# Patient Record
Sex: FEMALE | Race: BLACK OR AFRICAN AMERICAN | NOT HISPANIC OR LATINO | Employment: UNEMPLOYED
[De-identification: names, ages, dates, MRNs, and addresses within clinical notes are randomized per-mention and may not be internally consistent; named-entity substitution may affect disease eponyms.]

---

## 2017-11-16 ENCOUNTER — RECORDS - HEALTHEAST (OUTPATIENT)
Dept: ADMINISTRATIVE | Facility: OTHER | Age: 57
End: 2017-11-16

## 2018-01-13 ENCOUNTER — RECORDS - HEALTHEAST (OUTPATIENT)
Dept: ADMINISTRATIVE | Facility: OTHER | Age: 58
End: 2018-01-13

## 2018-01-26 ENCOUNTER — RECORDS - HEALTHEAST (OUTPATIENT)
Dept: ADMINISTRATIVE | Facility: OTHER | Age: 58
End: 2018-01-26

## 2018-02-06 ENCOUNTER — RECORDS - HEALTHEAST (OUTPATIENT)
Dept: ADMINISTRATIVE | Facility: OTHER | Age: 58
End: 2018-02-06

## 2018-02-07 ENCOUNTER — COMMUNICATION - HEALTHEAST (OUTPATIENT)
Dept: ONCOLOGY | Facility: HOSPITAL | Age: 58
End: 2018-02-07

## 2018-02-09 ENCOUNTER — RECORDS - HEALTHEAST (OUTPATIENT)
Dept: ADMINISTRATIVE | Facility: OTHER | Age: 58
End: 2018-02-09

## 2018-02-21 ENCOUNTER — RECORDS - HEALTHEAST (OUTPATIENT)
Dept: ADMINISTRATIVE | Facility: OTHER | Age: 58
End: 2018-02-21

## 2018-03-02 ENCOUNTER — RECORDS - HEALTHEAST (OUTPATIENT)
Dept: RADIOLOGY | Facility: CLINIC | Age: 58
End: 2018-03-02

## 2018-03-06 ENCOUNTER — AMBULATORY - HEALTHEAST (OUTPATIENT)
Dept: INFUSION THERAPY | Facility: HOSPITAL | Age: 58
End: 2018-03-06

## 2018-03-06 ENCOUNTER — COMMUNICATION - HEALTHEAST (OUTPATIENT)
Dept: TELEHEALTH | Facility: CLINIC | Age: 58
End: 2018-03-06

## 2018-03-06 ENCOUNTER — OFFICE VISIT - HEALTHEAST (OUTPATIENT)
Dept: ONCOLOGY | Facility: HOSPITAL | Age: 58
End: 2018-03-06

## 2018-03-06 DIAGNOSIS — R79.9 ABNORMAL FINDING OF BLOOD CHEMISTRY: ICD-10-CM

## 2018-03-06 DIAGNOSIS — C91.10 CLL (CHRONIC LYMPHOCYTIC LEUKEMIA) (H): ICD-10-CM

## 2018-03-06 LAB
ALBUMIN SERPL-MCNC: 3.4 G/DL (ref 3.5–5)
ALP SERPL-CCNC: 146 U/L (ref 45–120)
ALT SERPL W P-5'-P-CCNC: 36 U/L (ref 0–45)
ANION GAP SERPL CALCULATED.3IONS-SCNC: 10 MMOL/L (ref 5–18)
AST SERPL W P-5'-P-CCNC: 16 U/L (ref 0–40)
BASOPHILS # BLD AUTO: 0.1 THOU/UL (ref 0–0.2)
BASOPHILS NFR BLD AUTO: 0 % (ref 0–2)
BILIRUB SERPL-MCNC: 0.5 MG/DL (ref 0–1)
BUN SERPL-MCNC: 16 MG/DL (ref 8–22)
CALCIUM SERPL-MCNC: 9.3 MG/DL (ref 8.5–10.5)
CHLORIDE BLD-SCNC: 106 MMOL/L (ref 98–107)
CHOLEST SERPL-MCNC: 138 MG/DL
CO2 SERPL-SCNC: 25 MMOL/L (ref 22–31)
CREAT SERPL-MCNC: 0.83 MG/DL (ref 0.6–1.1)
EOSINOPHIL # BLD AUTO: 0.1 THOU/UL (ref 0–0.4)
EOSINOPHIL NFR BLD AUTO: 1 % (ref 0–6)
ERYTHROCYTE [DISTWIDTH] IN BLOOD BY AUTOMATED COUNT: 15.6 % (ref 11–14.5)
FASTING STATUS PATIENT QL REPORTED: YES
GFR SERPL CREATININE-BSD FRML MDRD: >60 ML/MIN/1.73M2
GLUCOSE BLD-MCNC: 125 MG/DL (ref 70–125)
HCT VFR BLD AUTO: 40.5 % (ref 35–47)
HDLC SERPL-MCNC: 44 MG/DL
HGB BLD-MCNC: 13.2 G/DL (ref 12–16)
LDLC SERPL CALC-MCNC: 71 MG/DL
LYMPHOCYTES # BLD AUTO: 5.7 THOU/UL (ref 0.8–4.4)
LYMPHOCYTES NFR BLD AUTO: 31 % (ref 20–40)
MCH RBC QN AUTO: 27.6 PG (ref 27–34)
MCHC RBC AUTO-ENTMCNC: 32.6 G/DL (ref 32–36)
MCV RBC AUTO: 85 FL (ref 80–100)
MONOCYTES # BLD AUTO: 1.6 THOU/UL (ref 0–0.9)
MONOCYTES NFR BLD AUTO: 9 % (ref 2–10)
NEUTROPHILS # BLD AUTO: 11 THOU/UL (ref 2–7.7)
NEUTROPHILS NFR BLD AUTO: 60 % (ref 50–70)
PLATELET # BLD AUTO: 474 THOU/UL (ref 140–440)
PMV BLD AUTO: 9.5 FL (ref 8.5–12.5)
POTASSIUM BLD-SCNC: 4.4 MMOL/L (ref 3.5–5)
PROT SERPL-MCNC: 7.3 G/DL (ref 6–8)
RBC # BLD AUTO: 4.79 MILL/UL (ref 3.8–5.4)
SODIUM SERPL-SCNC: 141 MMOL/L (ref 136–145)
TRIGL SERPL-MCNC: 116 MG/DL
WBC: 18.4 THOU/UL (ref 4–11)

## 2018-03-06 RX ORDER — METOPROLOL TARTRATE 25 MG/1
25 TABLET, FILM COATED ORAL 2 TIMES DAILY
Refills: 0 | Status: SHIPPED | COMMUNITY
Start: 2018-01-15

## 2018-03-06 RX ORDER — METFORMIN HCL 500 MG
500 TABLET, EXTENDED RELEASE 24 HR ORAL 2 TIMES DAILY
Refills: 0 | Status: SHIPPED | COMMUNITY
Start: 2018-01-23

## 2018-03-06 ASSESSMENT — MIFFLIN-ST. JEOR: SCORE: 1693.06

## 2018-03-07 LAB
LAB AP CHARGES (HE HISTORICAL CONVERSION): NORMAL
PATH REPORT.COMMENTS IMP SPEC: NORMAL
PATH REPORT.COMMENTS IMP SPEC: NORMAL
PATH REPORT.FINAL DX SPEC: NORMAL
PATH REPORT.MICROSCOPIC SPEC OTHER STN: NORMAL
RESULT FLAG (HE HISTORICAL CONVERSION): NORMAL

## 2018-03-09 ENCOUNTER — HOSPITAL ENCOUNTER (OUTPATIENT)
Dept: CARDIOLOGY | Facility: HOSPITAL | Age: 58
Discharge: HOME | End: 2018-03-09

## 2018-03-09 DIAGNOSIS — I38 VALVULAR HEART DISEASE: ICD-10-CM

## 2018-03-09 LAB
AORTIC ROOT: 3.6 CM
AORTIC VALVE MEAN VELOCITY: 97.4 CM/S
AV DIMENSIONLESS INDEX VTI: 0.9
AV MEAN GRADIENT: 4 MMHG
AV PEAK GRADIENT: 9.5 MMHG
AV VALVE AREA: 2.6 CM2
AV VELOCITY RATIO: 0.6
BSA FOR ECHO PROCEDURE: 2.27 M2
CV BLOOD PRESSURE: NORMAL MMHG
CV ECHO HEIGHT: 66 IN
CV ECHO WEIGHT: 245 LBS
DOP CALC AO PEAK VEL: 154 CM/S
DOP CALC AO VTI: 21.1 CM
DOP CALC LVOT AREA: 2.83 CM2
DOP CALC LVOT DIAMETER: 1.9 CM
DOP CALC LVOT PEAK VEL: 97.5 CM/S
DOP CALC LVOT STROKE VOLUME: 55 CM3
DOP CALCLVOT PEAK VEL VTI: 19.4 CM
EJECTION FRACTION: 56 % (ref 55–75)
FRACTIONAL SHORTENING: 23.9 % (ref 28–44)
INTERVENTRICULAR SEPTUM IN END DIASTOLE: 1.2 CM (ref 0.6–0.9)
IVS/PW RATIO: 1.4
LA AREA 1: 14 CM2
LA AREA 2: 17 CM2
LEFT ATRIUM LENGTH: 4.4 CM
LEFT ATRIUM SIZE: 3.8 CM
LEFT ATRIUM VOLUME INDEX: 20.3 ML/M2
LEFT ATRIUM VOLUME: 46 ML
LEFT VENTRICLE CARDIAC INDEX: 2.3 L/MIN/M2
LEFT VENTRICLE CARDIAC OUTPUT: 5.1 L/MIN
LEFT VENTRICLE DIASTOLIC VOLUME INDEX: 23.3 CM3/M2 (ref 34–74)
LEFT VENTRICLE DIASTOLIC VOLUME: 53 CM3 (ref 46–106)
LEFT VENTRICLE HEART RATE: 93 BPM
LEFT VENTRICLE MASS INDEX: 85.8 G/M2
LEFT VENTRICLE SYSTOLIC VOLUME INDEX: 10.4 CM3/M2 (ref 11–31)
LEFT VENTRICLE SYSTOLIC VOLUME: 23.5 CM3 (ref 14–42)
LEFT VENTRICULAR INTERNAL DIMENSION IN DIASTOLE: 5.1 CM (ref 3.8–5.2)
LEFT VENTRICULAR INTERNAL DIMENSION IN SYSTOLE: 3.88 CM (ref 2.2–3.5)
LEFT VENTRICULAR MASS: 194.7 G
LEFT VENTRICULAR OUTFLOW TRACT MEAN GRADIENT: 3 MMHG
LEFT VENTRICULAR OUTFLOW TRACT MEAN VELOCITY: 79.2 CM/S
LEFT VENTRICULAR OUTFLOW TRACT PEAK GRADIENT: 4 MMHG
LEFT VENTRICULAR POSTERIOR WALL IN END DIASTOLE: 0.85 CM (ref 0.6–0.9)
LV STROKE VOLUME INDEX: 24.2 ML/M2
MITRAL VALVE E/A RATIO: 0.6
MV AVERAGE E/E' RATIO: 7.7 CM/S
MV DECELERATION TIME: 140 MS
MV E'TISSUE VEL-LAT: 7.45 CM/S
MV E'TISSUE VEL-MED: 6.58 CM/S
MV LATERAL E/E' RATIO: 7.2
MV MEDIAL E/E' RATIO: 8.2
MV PEAK A VELOCITY: 82.9 CM/S
MV PEAK E VELOCITY: 53.8 CM/S
NUC REST DIASTOLIC VOLUME INDEX: 3920 LBS
NUC REST SYSTOLIC VOLUME INDEX: 66 IN
TRICUSPID REGURGITATION PEAK PRESSURE GRADIENT: 23 MMHG
TRICUSPID VALVE ANULAR PLANE SYSTOLIC EXCURSION: 2.2 CM
TRICUSPID VALVE PEAK REGURGITANT VELOCITY: 240 CM/S

## 2018-03-09 ASSESSMENT — MIFFLIN-ST. JEOR: SCORE: 1693.06

## 2018-03-14 ENCOUNTER — OFFICE VISIT - HEALTHEAST (OUTPATIENT)
Dept: ONCOLOGY | Facility: HOSPITAL | Age: 58
End: 2018-03-14

## 2018-03-14 DIAGNOSIS — D47.9 ATYPICAL LYMPHOPROLIFERATIVE DISORDER (H): ICD-10-CM

## 2018-03-27 ENCOUNTER — RECORDS - HEALTHEAST (OUTPATIENT)
Dept: RADIOLOGY | Facility: CLINIC | Age: 58
End: 2018-03-27

## 2018-04-20 ENCOUNTER — AMBULATORY - HEALTHEAST (OUTPATIENT)
Dept: ADMINISTRATIVE | Facility: REHABILITATION | Age: 58
End: 2018-04-20

## 2018-04-20 DIAGNOSIS — M17.9 OSTEOARTHRITIS OF KNEE, UNSPECIFIED (CODE): ICD-10-CM

## 2018-09-12 ENCOUNTER — AMBULATORY - HEALTHEAST (OUTPATIENT)
Dept: ADMINISTRATIVE | Facility: REHABILITATION | Age: 58
End: 2018-09-12

## 2018-09-12 ENCOUNTER — RECORDS - HEALTHEAST (OUTPATIENT)
Dept: LAB | Facility: CLINIC | Age: 58
End: 2018-09-12

## 2018-09-12 DIAGNOSIS — M54.9 BACK PAIN: ICD-10-CM

## 2018-09-12 LAB
ALBUMIN SERPL-MCNC: 3.8 G/DL (ref 3.5–5)
ALP SERPL-CCNC: 155 U/L (ref 45–120)
ALT SERPL W P-5'-P-CCNC: 28 U/L (ref 0–45)
ANION GAP SERPL CALCULATED.3IONS-SCNC: 9 MMOL/L (ref 5–18)
AST SERPL W P-5'-P-CCNC: 18 U/L (ref 0–40)
BILIRUB SERPL-MCNC: 0.6 MG/DL (ref 0–1)
BUN SERPL-MCNC: 10 MG/DL (ref 8–22)
CALCIUM SERPL-MCNC: 10 MG/DL (ref 8.5–10.5)
CHLORIDE BLD-SCNC: 108 MMOL/L (ref 98–107)
CHOLEST SERPL-MCNC: 149 MG/DL
CO2 SERPL-SCNC: 24 MMOL/L (ref 22–31)
CREAT SERPL-MCNC: 0.8 MG/DL (ref 0.6–1.1)
FASTING STATUS PATIENT QL REPORTED: ABNORMAL
GFR SERPL CREATININE-BSD FRML MDRD: >60 ML/MIN/1.73M2
GLUCOSE BLD-MCNC: 135 MG/DL (ref 70–125)
HDLC SERPL-MCNC: 47 MG/DL
LDLC SERPL CALC-MCNC: 86 MG/DL
POTASSIUM BLD-SCNC: 4.8 MMOL/L (ref 3.5–5)
PROT SERPL-MCNC: 7.7 G/DL (ref 6–8)
SODIUM SERPL-SCNC: 141 MMOL/L (ref 136–145)
TRIGL SERPL-MCNC: 81 MG/DL

## 2018-09-27 ENCOUNTER — OFFICE VISIT - HEALTHEAST (OUTPATIENT)
Dept: PHYSICAL THERAPY | Facility: REHABILITATION | Age: 58
End: 2018-09-27

## 2018-09-27 DIAGNOSIS — G89.29 CHRONIC BILATERAL LOW BACK PAIN WITHOUT SCIATICA: ICD-10-CM

## 2018-09-27 DIAGNOSIS — M54.50 CHRONIC BILATERAL LOW BACK PAIN WITHOUT SCIATICA: ICD-10-CM

## 2018-09-27 DIAGNOSIS — M62.81 MUSCLE WEAKNESS (GENERALIZED): ICD-10-CM

## 2018-10-09 ENCOUNTER — OFFICE VISIT - HEALTHEAST (OUTPATIENT)
Dept: ONCOLOGY | Facility: HOSPITAL | Age: 58
End: 2018-10-09

## 2018-10-09 ENCOUNTER — AMBULATORY - HEALTHEAST (OUTPATIENT)
Dept: INFUSION THERAPY | Facility: HOSPITAL | Age: 58
End: 2018-10-09

## 2018-10-09 DIAGNOSIS — D47.9 ATYPICAL LYMPHOPROLIFERATIVE DISORDER (H): ICD-10-CM

## 2018-10-09 DIAGNOSIS — L98.9 LESION OF SKIN OF SCALP: ICD-10-CM

## 2018-10-09 LAB
ALBUMIN SERPL-MCNC: 3.7 G/DL (ref 3.5–5)
ALP SERPL-CCNC: 153 U/L (ref 45–120)
ALT SERPL W P-5'-P-CCNC: 28 U/L (ref 0–45)
ANION GAP SERPL CALCULATED.3IONS-SCNC: 7 MMOL/L (ref 5–18)
AST SERPL W P-5'-P-CCNC: 17 U/L (ref 0–40)
BASOPHILS # BLD AUTO: 0.1 THOU/UL (ref 0–0.2)
BASOPHILS NFR BLD AUTO: 0 % (ref 0–2)
BILIRUB SERPL-MCNC: 0.4 MG/DL (ref 0–1)
BUN SERPL-MCNC: 11 MG/DL (ref 8–22)
CALCIUM SERPL-MCNC: 9.9 MG/DL (ref 8.5–10.5)
CHLORIDE BLD-SCNC: 106 MMOL/L (ref 98–107)
CO2 SERPL-SCNC: 27 MMOL/L (ref 22–31)
CREAT SERPL-MCNC: 0.87 MG/DL (ref 0.6–1.1)
EOSINOPHIL # BLD AUTO: 0.1 THOU/UL (ref 0–0.4)
EOSINOPHIL NFR BLD AUTO: 1 % (ref 0–6)
ERYTHROCYTE [DISTWIDTH] IN BLOOD BY AUTOMATED COUNT: 15.8 % (ref 11–14.5)
GFR SERPL CREATININE-BSD FRML MDRD: >60 ML/MIN/1.73M2
GLUCOSE BLD-MCNC: 143 MG/DL (ref 70–125)
HCT VFR BLD AUTO: 41.8 % (ref 35–47)
HGB BLD-MCNC: 13.5 G/DL (ref 12–16)
LYMPHOCYTES # BLD AUTO: 5 THOU/UL (ref 0.8–4.4)
LYMPHOCYTES NFR BLD AUTO: 36 % (ref 20–40)
MCH RBC QN AUTO: 27.7 PG (ref 27–34)
MCHC RBC AUTO-ENTMCNC: 32.3 G/DL (ref 32–36)
MCV RBC AUTO: 86 FL (ref 80–100)
MONOCYTES # BLD AUTO: 1.2 THOU/UL (ref 0–0.9)
MONOCYTES NFR BLD AUTO: 9 % (ref 2–10)
NEUTROPHILS # BLD AUTO: 7.3 THOU/UL (ref 2–7.7)
NEUTROPHILS NFR BLD AUTO: 54 % (ref 50–70)
PLATELET # BLD AUTO: 459 THOU/UL (ref 140–440)
PMV BLD AUTO: 9.1 FL (ref 8.5–12.5)
POTASSIUM BLD-SCNC: 4.3 MMOL/L (ref 3.5–5)
PROT SERPL-MCNC: 8 G/DL (ref 6–8)
RBC # BLD AUTO: 4.87 MILL/UL (ref 3.8–5.4)
SODIUM SERPL-SCNC: 140 MMOL/L (ref 136–145)
WBC: 13.6 THOU/UL (ref 4–11)

## 2018-10-09 RX ORDER — CYCLOBENZAPRINE HCL 10 MG
10 TABLET ORAL
Status: SHIPPED | COMMUNITY
Start: 2018-04-09 | End: 2022-07-14

## 2018-10-11 ENCOUNTER — OFFICE VISIT - HEALTHEAST (OUTPATIENT)
Dept: PHYSICAL THERAPY | Facility: REHABILITATION | Age: 58
End: 2018-10-11

## 2018-10-11 DIAGNOSIS — M62.81 MUSCLE WEAKNESS (GENERALIZED): ICD-10-CM

## 2018-10-11 DIAGNOSIS — G89.29 CHRONIC BILATERAL LOW BACK PAIN WITHOUT SCIATICA: ICD-10-CM

## 2018-10-11 DIAGNOSIS — M54.50 CHRONIC BILATERAL LOW BACK PAIN WITHOUT SCIATICA: ICD-10-CM

## 2018-10-12 ENCOUNTER — COMMUNICATION - HEALTHEAST (OUTPATIENT)
Dept: ONCOLOGY | Facility: HOSPITAL | Age: 58
End: 2018-10-12

## 2019-02-19 ENCOUNTER — COMMUNICATION - HEALTHEAST (OUTPATIENT)
Dept: ONCOLOGY | Facility: HOSPITAL | Age: 59
End: 2019-02-19

## 2019-03-13 ENCOUNTER — TRANSFERRED RECORDS (OUTPATIENT)
Dept: HEALTH INFORMATION MANAGEMENT | Facility: CLINIC | Age: 59
End: 2019-03-13
Payer: MEDICARE

## 2019-04-19 ENCOUNTER — OFFICE VISIT - HEALTHEAST (OUTPATIENT)
Dept: SURGERY | Facility: CLINIC | Age: 59
End: 2019-04-19

## 2019-04-19 DIAGNOSIS — K42.9 UMBILICAL HERNIA WITHOUT OBSTRUCTION AND WITHOUT GANGRENE: ICD-10-CM

## 2019-04-19 ASSESSMENT — MIFFLIN-ST. JEOR: SCORE: 1674.92

## 2019-07-18 ENCOUNTER — RECORDS - HEALTHEAST (OUTPATIENT)
Dept: LAB | Facility: CLINIC | Age: 59
End: 2019-07-18

## 2019-07-18 LAB
ALBUMIN SERPL-MCNC: 3.7 G/DL (ref 3.5–5)
ALP SERPL-CCNC: 155 U/L (ref 45–120)
ALT SERPL W P-5'-P-CCNC: 32 U/L (ref 0–45)
ANION GAP SERPL CALCULATED.3IONS-SCNC: 10 MMOL/L (ref 5–18)
AST SERPL W P-5'-P-CCNC: 21 U/L (ref 0–40)
BILIRUB SERPL-MCNC: 0.4 MG/DL (ref 0–1)
BUN SERPL-MCNC: 10 MG/DL (ref 8–22)
CALCIUM SERPL-MCNC: 9.6 MG/DL (ref 8.5–10.5)
CHLORIDE BLD-SCNC: 107 MMOL/L (ref 98–107)
CHOLEST SERPL-MCNC: 150 MG/DL
CO2 SERPL-SCNC: 23 MMOL/L (ref 22–31)
CREAT SERPL-MCNC: 0.8 MG/DL (ref 0.6–1.1)
FASTING STATUS PATIENT QL REPORTED: YES
GFR SERPL CREATININE-BSD FRML MDRD: >60 ML/MIN/1.73M2
GLUCOSE BLD-MCNC: 117 MG/DL (ref 70–125)
HDLC SERPL-MCNC: 46 MG/DL
LDLC SERPL CALC-MCNC: 87 MG/DL
POTASSIUM BLD-SCNC: 4.4 MMOL/L (ref 3.5–5)
PROT SERPL-MCNC: 7.6 G/DL (ref 6–8)
SODIUM SERPL-SCNC: 140 MMOL/L (ref 136–145)
TRIGL SERPL-MCNC: 83 MG/DL

## 2020-08-19 ENCOUNTER — RECORDS - HEALTHEAST (OUTPATIENT)
Dept: LAB | Facility: CLINIC | Age: 60
End: 2020-08-19

## 2020-08-19 LAB
ALBUMIN SERPL-MCNC: 3.7 G/DL (ref 3.5–5)
ALP SERPL-CCNC: 161 U/L (ref 45–120)
ALT SERPL W P-5'-P-CCNC: 23 U/L (ref 0–45)
ANION GAP SERPL CALCULATED.3IONS-SCNC: 10 MMOL/L (ref 5–18)
AST SERPL W P-5'-P-CCNC: 15 U/L (ref 0–40)
BILIRUB SERPL-MCNC: 0.5 MG/DL (ref 0–1)
BUN SERPL-MCNC: 10 MG/DL (ref 8–22)
CALCIUM SERPL-MCNC: 9.1 MG/DL (ref 8.5–10.5)
CHLORIDE BLD-SCNC: 107 MMOL/L (ref 98–107)
CHOLEST SERPL-MCNC: 146 MG/DL
CO2 SERPL-SCNC: 23 MMOL/L (ref 22–31)
CREAT SERPL-MCNC: 0.81 MG/DL (ref 0.6–1.1)
FASTING STATUS PATIENT QL REPORTED: ABNORMAL
GFR SERPL CREATININE-BSD FRML MDRD: >60 ML/MIN/1.73M2
GLUCOSE BLD-MCNC: 148 MG/DL (ref 70–125)
HDLC SERPL-MCNC: 43 MG/DL
LDLC SERPL CALC-MCNC: 87 MG/DL
LIPASE SERPL-CCNC: 40 U/L (ref 0–52)
POTASSIUM BLD-SCNC: 4.1 MMOL/L (ref 3.5–5)
PROT SERPL-MCNC: 7.5 G/DL (ref 6–8)
SODIUM SERPL-SCNC: 140 MMOL/L (ref 136–145)
TRIGL SERPL-MCNC: 81 MG/DL

## 2020-08-20 LAB — BACTERIA SPEC CULT: NO GROWTH

## 2021-01-14 LAB — RETINOPATHY: NORMAL

## 2021-04-27 ENCOUNTER — RECORDS - HEALTHEAST (OUTPATIENT)
Dept: LAB | Facility: CLINIC | Age: 61
End: 2021-04-27

## 2021-04-29 LAB — BACTERIA SPEC CULT: ABNORMAL

## 2021-05-28 NOTE — PROGRESS NOTES
Hernia education & surgery packet provided to pt.    Artemio Johnson CMA ([facility])     Ph: [phone]    Fx: [phone]

## 2021-05-28 NOTE — PROGRESS NOTES
"HPI:  This is a 58 y.o. female here today with concerns of pain and bulging in her umbilicus area. She has noted this for the past few months . The symptoms have progressed and increased over this time. She comes in for evaluation secondary to the hernia causing enough issues to bother them with daily activities or chores.    Allergies:Codeine; Hydrocodone; and Penicillins    Past Medical History:   Diagnosis Date     Asthma      Diabetes mellitus (H)      Hypertension      Lymphoma (H)        Past Surgical History:   Procedure Laterality Date     CHOLECYSTECTOMY       HYSTERECTOMY       KNEE ARTHROTOMY       NEPHRECTOMY       SPINAL FUSION         CURRENT MEDS:      History reviewed. No pertinent family history.     reports that she has been smoking cigarettes.  She has a 12.50 pack-year smoking history. She has never used smokeless tobacco. She reports that she does not drink alcohol or use drugs.    Review of Systems - Negative except umbilical hernia noted on CT. Otherwise twelve system of review is negative.      Vitals:    04/19/19 1040   BP: 143/64   Patient Site: Right Arm   Patient Position: Sitting   Cuff Size: Adult Large   Pulse: 100   SpO2: 95%   Weight: (!) 241 lb (109.3 kg)   Height: 5' 6\" (1.676 m)       Body mass index is 38.9 kg/m .    EXAM:  General: NAD   HEENT: normocephalic, PERRLA and EOMS intact  Mounth: Mucus membranes moist  Neck: Supple  Chest: Clear to auscultation bilaterally  CV: RRR  ABD: Soft nontender and nondistended, umbilical hernia, reducible  EXT: Warm, pulses intact,   Neuro: Alert and oriented x3  Back: no CVA tenderness      IMAGES: reviewed  Ct showing a sub cm sized umbilical hernia.     Assessment/Plan: Pt with a umbilical hernia. I discussed this at length with She.  I went over conservative management as well as surgical treatment of this approach with possible use of mesh. I went over the small risks of surgery including but not limited to bleeding and infection, " anesthesia, recurrence rates and nerve injury. I discussed the expected recovery time as well. At this time I would not recommend repair but we could. She at this time will watch and retiurn if increasing symptoms or issues.      Roger Stanley MD  [facility] Department of Surgery

## 2021-06-01 VITALS — BODY MASS INDEX: 39.19 KG/M2 | WEIGHT: 242.8 LBS

## 2021-06-01 VITALS — HEIGHT: 66 IN | WEIGHT: 245 LBS | BODY MASS INDEX: 39.37 KG/M2

## 2021-06-01 VITALS — WEIGHT: 245 LBS | HEIGHT: 66 IN | BODY MASS INDEX: 39.37 KG/M2

## 2021-06-02 VITALS — HEIGHT: 66 IN | BODY MASS INDEX: 38.73 KG/M2 | WEIGHT: 241 LBS

## 2021-06-02 VITALS — BODY MASS INDEX: 39.71 KG/M2 | WEIGHT: 246 LBS

## 2021-06-16 PROBLEM — D47.9: Status: ACTIVE | Noted: 2018-03-16

## 2021-06-16 NOTE — PROGRESS NOTES
[facility] Hematology and Oncology Progress Note    Patient: Elidia Ventura  MRN: [id number]  Date of Service: 03/14/2018      Assessment and Plan:    1.  History of atypical lymphoid infiltrate: She is a chronic neutrophilia.  This could be from obesity.  She has a mild monocytosis and lymphocytosis.  Her flow cytometry is negative.  No evidence of CLL.  She has no systemic symptoms.  I do not think there is any indication for CT scan imaging.  I do not see an indication of cancer.  I like to follow her for a little while to see if anything else evolves or emerges.  We will see her back in 6 months.    ECOG Performance   ECOG Performance Status: 2    Distress Assessment  Distress Assessment Score: 2    Pain  Currently in Pain: No/denies    Diagnosis:    1.  Atypical lymphoid infiltrate: Found on a lymph node taken at spinal fusion surgery in 2006.  Pathology was suggestive of lymphoproliferative disorder.  IgG gene rearrangements were negative.  I do not find that she ever received a formal diagnosis.  No treatment has been given.    Treatment:    None.    Interim History:    Elidia returns for short-term follow-up visit.  She is here to review the results of the flow cytometry.  In general she has been feeling okay.  No fevers, chills, or night sweats.  No new symptoms or complaints since I last saw her.    Review of Systems:    Constitutional  Constitutional (WDL): Exceptions to WDL  Fatigue: Fatigue not relieved by rest - Limiting instrumental ADL  Neurosensory  Neurosensory (WDL): Exceptions to WDL  Ataxia: Asymptomatic, clinical or diagnostic observations only, intervention not indicated  Cardiovascular  Cardiovascular (WDL): Exceptions to WDL  Palpitations: Definition: A disorder characterized by inflammation of the muscle tissue of the heart.  Pulmonary  Respiratory (WDL): Within Defined Limits  Gastrointestinal  Gastrointestinal (WDL): All gastrointestinal elements are within defined  limits  Genitourinary  Genitourinary (WDL): Exceptions to WDL  Urinary Frequency: Present  Integumentary  Integumentary (WDL): All integumentary elements are within defined limits  Patient Coping  Patient Coping: Anxiety  Accompanied by  Accompanied by: Family Member (daughter)    Past History:    Past Medical History:   Diagnosis Date     Asthma      Diabetes mellitus      Hypertension      Lymphoma      Physical Exam:    Recent Vitals 3/14/2018   Height -   Weight 242 lbs 13 oz   BSA (m2) -   /82   Pulse 83   Temp 97.8   Temp src 1   SpO2 95   Some recent data might be hidden     General: patient appears stated age of 57 y.o.. Nontoxic and in no distress.   HEENT: Head: atraumatic, normocephalic. Sclerae anicteric.  Chest:  Normal respiratory effort  Cardiac:  No edema.   Abdomen: abdomen is non-distended  Extremities: normal tone and muscle bulk.  Skin: no lesions or rash. Warm and dry.   CNS: alert and oriented x3. Grossly non-focal.   Psychiatric: normal mood and affect.     Lab Results:    No results found for this or any previous visit (from the past 168 hour(s)).     Imaging:    No results found.     Pathology:    Diagnosis   PERIPHERAL BLOOD, FLOW CYTOMETRY IMMUNOPHENOTYPIC CHARACTERIZATION:     - HETEROGENEOUS T, B, AND NK-CELLS WITHOUT EVIDENCE OF A MONOCLONAL B-CELL POPULATION       OR ABERRANT T-CELL MARKER EXPRESSION     - NO INCREASE IN CD34(+) BLASTS     - NO INCREASE IN (+) PLASMA CELLS   Electronically signed by Cristian Hayden MD on 3/7/2018 at 1008   Comment  The clinical history has been reviewed. Flow cytometry identifies 1.3% CD20/CD5(+) B-cells (0.24 x 10*3/uL), a very small atypical population that does not meet criteria for monoclonal B-cell lymphocytosis. Clinical correlation is suggested.          Signed by: Luiig Colindres MD

## 2021-06-16 NOTE — CONSULTS
[facility] Hematology and Oncology Consult Note    Patient: Elidia Ventura  MRN: [id number]  Date of Service: 03/06/2018      Reason for Visit:    1.  History of lymphoproliferative disorder    Assessment/Plan:    1.  Lymphoproliferative disorder: Patient has a vague history.  Apparently in 2006 she had a lymph node removed from her neck which showed a lymph node demonstrating and effaced architecture with monomorphic lymphoid population displaying vague nodularity.  Stains show the majority of CD20 positive B cells which are CD43 and BCL-2 positive.  CD3 and CD5 showed scattered T-cells.  CD10 and cyclin D1 were negative.  Small B-cell lymphoma sick lymphoma was suggested as a possibility.  Immunoglobulin heavy chain rearrangement studies were negative.  Apparently the patient had follow-up CAT scans which were unrevealing.  She did not have any treatment done.  Today we will check a CBC as well as a flow cytometry.  It is possible she might have CLL/SLL.  I do not think she needs to have any surveillance imaging as is been over 10 years since the initial diagnosis and she has no symptoms clinically.  We will see her back in about a week to review her test results.    2.  Healthcare maintenance: She has not had a mammogram in many years.  I offered to schedule one for her but she declined.  I stressed the importance of follow-up mammography and she said she will promise to make an appointment on her own.    ECOG Performance        Distress Assessment       Problem List:    1. CLL (chronic lymphocytic leukemia)  HM1(CBC and Differential)    Comprehensive Metabolic Panel    Flow cytometry    Lipid Profile    HM1(CBC and Differential)    Comprehensive Metabolic Panel    Flow cytometry    Lipid Profile    HM1 (CBC with Diff)   2. Abnormal finding of blood chemistry   Lipid Profile    Lipid Profile     Staging History:    No matching staging information was found for the patient.    History:    Elidia is a 49-year-old  woman who is referred by primary care for follow-up regarding a possible non-Hodgkin's lymphoma.  The patient had a lymph node taken at the time of an anterior cervical discectomy and fusion in May 2006.  This showed findings suggestive of a lymphoproliferative disorder, possibly CLL.  It does not sound like a formal diagnosis was ever rendered.  She has not received any treatment.  She had been getting follow-up CAT scans but this was stopped eventually.  Today she is asymptomatic.  No fevers, chills, night sweats.  No chest pain or shortness of breath.  No abdominal pain or nausea or vomiting.  No unintentional weight loss.    Past History:    Past Medical History:   Diagnosis Date     Asthma      Diabetes mellitus      Hypertension      Lymphoma     History reviewed. No pertinent family history.   [unfilled] Social History     Social History     Marital status:      Spouse name: N/A     Number of children: N/A     Years of education: N/A     Occupational History     Not on file.     Social History Main Topics     Smoking status: Current Every Day Smoker     Packs/day: 0.50     Years: 25.00     Types: Cigarettes     Smokeless tobacco: Never Used      Comment: 8 cigs per day      Alcohol use No     Drug use: No     Sexual activity: No     Other Topics Concern     Not on file     Social History Narrative        Allergies:    Allergies   Allergen Reactions     Codeine      Low blood pressure       Hydrocodone      Penicillins Swelling     Review of Systems:    General  General (WDL): Exceptions to WDL  Generalized Muscle Weakness: Yes - Recent (Less than 3 months)  ENT  ENT (WDL): Exceptions to WDL  Vertigo (Dizziness): Yes, Recent (Less than 3 months)  Glasses or Contacts: Yes - Recent (Less than 3 months)  Sinus Congestion/Drainage: Yes - Recent (Less than 3 months)  Dental Problems: Yes - Recent (Less than 3 months)  Respiratory  Respiratory (WDL): Exceptions to WDL  Non-Cardiac Chest Pain: Yes - Recent (Less  than 3 months)  Cardiovascular  Cardiovascular (WDL): Exceptions to WDL  Palpitations: Yes - Recent (Less than 3 months)  Irregular Heart Beat: Yes - Recent (Less than 3 months)  Chest Pain: Yes - Recent (Less than 3 months)  Endocrine  Endocrine (WDL): Exceptions to WDL  Hotflashes: Yes -Recent (Less than 3 months)  Gastrointestinal  Gastrointestinal (WDL): All gastrointestinal elements are within defined limits  Musculoskeletal  Musculoskeletal (WDL): Exceptions to WDL  Joint pain: Yes - Recent (Less than 3 months)  Back Pain: Yes - Recent (Less than 3 months)  Activity Assistance: Yes - Recent (Less than 3 months)  Activity Assistance: Stand by assist  Difficulty to lie flat for more than 30 minutes: Yes - Recent (Less than 3 months)  Pain interfering with walking: Yes - Recent (Less than 3 months)  Muscle pain or stiffness: Yes - Recent (Less than 3 months)  Assistive device: Yes - Recent (Less than 3 months)  Neurological  Neurological (WDL): Exceptions to WDL  Vertigo (Dizziness): Yes, Recent (Less than 3 months)  Dominant Hand: Right  Psychological/Emotional  Psychological/Emotional (WDL): Exceptions to WDL  Insomnia: Yes - Recent (Less than 3 months)  Panic attacks: Yes - Recent (Less than 3 months)  Anxiety: Yes - Recent (Less than 3 months)  Hematological/Lymphatic  Hematological/Lymphatic (WDL): All hematological/lymphatic elements are within defined limits  Dermatological  Dermatologic (WDL): All dermatological elements are within defined limits  Genitourinary/Reproductive  Genitourinary/Reproductive (WDL): Exceptions to WDL  Urination more than 2 times a night: Yes - Recent (Less than 3 months)  Urinary Urgency: Yes - Recent (Less than 3 months)  Reproductive (Females only)     Pain  Currently in Pain: Yes  Pain Score (Initial OR Reassessment): 8  Pain Frequency: Intermittent  Location: joint pain depending on activity  Pain Characteristics : Aching    Physical Exam:    Recent Vitals 3/6/2018   Height  "5' 6\"   Weight 245 lbs   BSA (m2) 2.27 m2   /64   Pulse 93   Temp 97.7   Temp src 1   SpO2 95     General: patient appears stated age of 57 y.o.. Nontoxic and in no distress.   HEENT: Head: atraumatic, normocephalic. Sclerae anicteric.  Chest:  Normal respiratory effort  Cardiac:  No edema.   Abdomen: abdomen is non-distended  Extremities: normal tone and muscle bulk.  Skin: no lesions or rash. Warm and dry.   CNS: alert and oriented x3. Grossly non-focal.   Psychiatric: normal mood and affect.   Nodes: No palpable cervical, supraclavicular, or axillary nodes bilaterally.    Lab Results:    Recent Results (from the past 168 hour(s))   Comprehensive Metabolic Panel   Result Value Ref Range    Sodium 141 136 - 145 mmol/L    Potassium 4.4 3.5 - 5.0 mmol/L    Chloride 106 98 - 107 mmol/L    CO2 25 22 - 31 mmol/L    Anion Gap, Calculation 10 5 - 18 mmol/L    Glucose 125 70 - 125 mg/dL    BUN 16 8 - 22 mg/dL    Creatinine 0.83 0.60 - 1.10 mg/dL    GFR MDRD Af Amer >60 >60 mL/min/1.73m2    GFR MDRD Non Af Amer >60 >60 mL/min/1.73m2    Bilirubin, Total 0.5 0.0 - 1.0 mg/dL    Calcium 9.3 8.5 - 10.5 mg/dL    Protein, Total 7.3 6.0 - 8.0 g/dL    Albumin 3.4 (L) 3.5 - 5.0 g/dL    Alkaline Phosphatase 146 (H) 45 - 120 U/L    AST 16 0 - 40 U/L    ALT 36 0 - 45 U/L   Lipid Profile   Result Value Ref Range    Triglycerides 116 <=149 mg/dL    Cholesterol 138 <=199 mg/dL    LDL Calculated 71 <=129 mg/dL    HDL Cholesterol 44 (L) >=50 mg/dL    Patient Fasting > 8hrs? Yes    HM1 (CBC with Diff)   Result Value Ref Range    WBC 18.4 (H) 4.0 - 11.0 thou/uL    RBC 4.79 3.80 - 5.40 mill/uL    Hemoglobin 13.2 12.0 - 16.0 g/dL    Hematocrit 40.5 35.0 - 47.0 %    MCV 85 80 - 100 fL    MCH 27.6 27.0 - 34.0 pg    MCHC 32.6 32.0 - 36.0 g/dL    RDW 15.6 (H) 11.0 - 14.5 %    Platelets 474 (H) 140 - 440 thou/uL    MPV 9.5 8.5 - 12.5 fL    Neutrophils % 60 50 - 70 %    Lymphocytes % 31 20 - 40 %    Monocytes % 9 2 - 10 %    Eosinophils % 1 0 - " "6 %    Basophils % 0 0 - 2 %    Neutrophils Absolute 11.0 (H) 2.0 - 7.7 thou/uL    Lymphocytes Absolute 5.7 (H) 0.8 - 4.4 thou/uL    Monocytes Absolute 1.6 (H) 0.0 - 0.9 thou/uL    Eosinophils Absolute 0.1 0.0 - 0.4 thou/uL    Basophils Absolute 0.1 0.0 - 0.2 thou/uL     Imaging Results:    No results found.    Pathology:    I personally reviewed the pathology report from May 9, 2006.  Findings are as outlined under the \"assessment and plan\" section.      Signed by: Luigi Colindres MD    "
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
## 2021-06-20 NOTE — PROGRESS NOTES
"[facility] Hematology and Oncology Progress Note    Patient: Elidia Ventura  MRN: [id number]  Date of Service:  October 9, 2018      Assessment and Plan:    1.  History of atypical lymphoid infiltrate: When compared to 6 months ago, her white count is better.  Her neutrophil count is now normal.  She still has a very mild lymphocytosis and monocytosis.  We will recheck a flow cytometry.  It was negative in March.  She had a CT scan of the body in April of this year which showed no lymphadenopathy or splenomegaly.  No evidence of any lymphoproliferative disorder.  For now we will continue to monitor clinically.  We will see her back in 6 months with repeat labs.  If her results look normal we will probably switch to yearly follow-up.    2.  Scalp lesion: Seems hyperkeratotic in nature.  Referral to dermatology was placed.    ECOG Performance   ECOG Performance Status: 1    Distress Assessment  Distress Assessment Score: No distress    Pain  Currently in Pain: Yes  Pain Score (Initial OR Reassessment): 4  Location: hip    Diagnosis:    1.  Atypical lymphoid infiltrate: Found on a lymph node taken at spinal fusion surgery in 2006.  Pathology was suggestive of lymphoproliferative disorder.  IgG gene rearrangements were negative.  I do not find that she ever received a formal diagnosis.  No treatment has been given.    Treatment:    None.    Interim History:    Elidia returns for short-term follow-up visit.  She is here for six-month follow-up.  Overall she is been doing okay.  She has a lesion on her scalp which she is wondering about.  It seems to \"grow\" and then fell off and recur.  Occasionally bleeds.  No other symptoms.  No fevers, chills, night sweats.    Review of Systems:    Constitutional  Constitutional (WDL): Exceptions to WDL  Fatigue: Fatigue relieved by rest  Neurosensory  Neurosensory (WDL): Exceptions to WDL  Peripheral Sensory Neuropathy: Asymptomatic, loss of deep tendon reflexes or " "paresthesia  Cardiovascular  Cardiovascular (WDL): All cardiovascular elements are within defined limits  Pulmonary  Respiratory (WDL): Exceptions to WDL  Dyspnea: Shortness of breath with minimal exertion, limiting instrumental ADL  Gastrointestinal  Gastrointestinal (WDL): All gastrointestinal elements are within defined limits  Genitourinary  Genitourinary (WDL): All genitourinary elements are within defined limits  Integumentary  Integumentary (WDL): Exceptions to WDL (mole on head \"feels like it's changing\")  Patient Coping  Patient Coping: Accepting  Accompanied by  Accompanied by: Family Member    Past History:    Past Medical History:   Diagnosis Date     Asthma      Diabetes mellitus (H)      Hypertension      Lymphoma (H)      Physical Exam:    Recent Vitals 10/9/2018   Height -   Weight 246 lbs   BSA (m2) -   /71   Pulse 94   Temp 97.8   Temp src 1   SpO2 92   Some recent data might be hidden     General: patient appears stated age of 58 y.o.. Nontoxic and in no distress.   HEENT: Head: atraumatic, normocephalic. Sclerae anicteric.  Chest:  Normal respiratory effort  Cardiac:  No edema.   Abdomen: abdomen is non-distended  Extremities: normal tone and muscle bulk.  Skin: no lesions or rash.  Keratotic lesion on the scalp.  CNS: alert and oriented . Grossly non-focal.   Psychiatric: normal mood and affect.     Lab Results:    Recent Results (from the past 168 hour(s))   Comprehensive Metabolic Panel   Result Value Ref Range    Sodium 140 136 - 145 mmol/L    Potassium 4.3 3.5 - 5.0 mmol/L    Chloride 106 98 - 107 mmol/L    CO2 27 22 - 31 mmol/L    Anion Gap, Calculation 7 5 - 18 mmol/L    Glucose 143 (H) 70 - 125 mg/dL    BUN 11 8 - 22 mg/dL    Creatinine 0.87 0.60 - 1.10 mg/dL    GFR MDRD Af Amer >60 >60 mL/min/1.73m2    GFR MDRD Non Af Amer >60 >60 mL/min/1.73m2    Bilirubin, Total 0.4 0.0 - 1.0 mg/dL    Calcium 9.9 8.5 - 10.5 mg/dL    Protein, Total 8.0 6.0 - 8.0 g/dL    Albumin 3.7 3.5 - 5.0 g/dL "    Alkaline Phosphatase 153 (H) 45 - 120 U/L    AST 17 0 - 40 U/L    ALT 28 0 - 45 U/L   HM1 (CBC with Diff)   Result Value Ref Range    WBC 13.6 (H) 4.0 - 11.0 thou/uL    RBC 4.87 3.80 - 5.40 mill/uL    Hemoglobin 13.5 12.0 - 16.0 g/dL    Hematocrit 41.8 35.0 - 47.0 %    MCV 86 80 - 100 fL    MCH 27.7 27.0 - 34.0 pg    MCHC 32.3 32.0 - 36.0 g/dL    RDW 15.8 (H) 11.0 - 14.5 %    Platelets 459 (H) 140 - 440 thou/uL    MPV 9.1 8.5 - 12.5 fL    Neutrophils % 54 50 - 70 %    Lymphocytes % 36 20 - 40 %    Monocytes % 9 2 - 10 %    Eosinophils % 1 0 - 6 %    Basophils % 0 0 - 2 %    Neutrophils Absolute 7.3 2.0 - 7.7 thou/uL    Lymphocytes Absolute 5.0 (H) 0.8 - 4.4 thou/uL    Monocytes Absolute 1.2 (H) 0.0 - 0.9 thou/uL    Eosinophils Absolute 0.1 0.0 - 0.4 thou/uL    Basophils Absolute 0.1 0.0 - 0.2 thou/uL      Imaging:    Result Narrative   1. CT ANGIOGRAM CHEST  2. CT ABDOMEN AND PELVIS WITH CONTRAST  4/9/2018 8:59 PM    INDICATION: Right lower lateral chest pain. Abdominal pain.  TECHNIQUE: Helical acquisition through the chest was performed during the  arterial phase of contrast enhancement using IV Contrast. 2D and 3D  reconstructions performed by the CT technologist. CT abdomen and pelvis were  also performed in the portal venous phase of contrast enhancement. Dose  reduction techniques were used.   IV CONTRAST: Omnipaque 350 110ml  COMPARISON: None.    FINDINGS:   CTA CHEST: Negative for pulmonary embolism or aortic dissection. Normal caliber  pulmonary arteries. Normal caliber thoracic aorta. Patent aortic arch great  vessels.    CHEST:  LUNGS AND PLEURA: Mild linear atelectasis and/or scarring in the mid and lower  lungs. No focal consolidation, pleural effusion or pneumothorax.    MEDIASTINUM: Mildly heterogeneous thyroid gland. Small esophageal hiatal hernia.  No adenopathy. No pericardial effusion.    ABDOMEN: No acute findings. No free air or free fluid. No evidence of bowel  obstruction. No peripancreatic  stranding. Diffuse hepatic steatosis.  Cholecystectomy. Small left renal cyst. Kidneys otherwise normal. No urinary  stone or hydronephrosis. Adrenal glands, spleen and pancreas are normal. No  adenopathy.    PELVIS: Mild colonic diverticulosis. No evidence of diverticulitis or colitis.  Normal appendix. No adenopathy. No free fluid.    MUSCULOSKELETAL: No acute osseous abnormality. Postoperative changes lower  cervical spine with anterior hardware. Mild scattered degenerative changes in  the spine.    CONCLUSION:   1. No acute findings in the chest, abdomen or pelvis. Negative for pulmonary  embolism or aortic dissection.  2. Diffuse hepatic steatosis.       Signed by: Luigi Colindres MD
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
## 2021-08-03 PROBLEM — C91.10 CLL (CHRONIC LYMPHOCYTIC LEUKEMIA) (H): Status: RESOLVED | Noted: 2018-03-06 | Resolved: 2018-03-16

## 2021-08-05 ENCOUNTER — LAB REQUISITION (OUTPATIENT)
Dept: LAB | Facility: CLINIC | Age: 61
End: 2021-08-05
Payer: MEDICARE

## 2021-08-05 DIAGNOSIS — R30.0 DYSURIA: ICD-10-CM

## 2021-08-05 PROCEDURE — 87086 URINE CULTURE/COLONY COUNT: CPT | Performed by: NURSE PRACTITIONER

## 2021-08-07 ENCOUNTER — APPOINTMENT (OUTPATIENT)
Dept: CT IMAGING | Facility: HOSPITAL | Age: 61
End: 2021-08-07
Attending: EMERGENCY MEDICINE
Payer: MEDICARE

## 2021-08-07 LAB
ALBUMIN SERPL-MCNC: 3.6 G/DL (ref 3.5–5)
ALP SERPL-CCNC: 175 U/L (ref 45–120)
ALT SERPL W P-5'-P-CCNC: 22 U/L (ref 0–45)
ANION GAP SERPL CALCULATED.3IONS-SCNC: 8 MMOL/L (ref 5–18)
AST SERPL W P-5'-P-CCNC: 17 U/L (ref 0–40)
BACTERIA UR CULT: ABNORMAL
BASOPHILS # BLD MANUAL: 0 10E3/UL (ref 0–0.2)
BASOPHILS NFR BLD MANUAL: 0 %
BILIRUB SERPL-MCNC: 0.2 MG/DL (ref 0–1)
BUN SERPL-MCNC: 16 MG/DL (ref 8–22)
CALCIUM SERPL-MCNC: 9.6 MG/DL (ref 8.5–10.5)
CHLORIDE BLD-SCNC: 108 MMOL/L (ref 98–107)
CO2 SERPL-SCNC: 23 MMOL/L (ref 22–31)
CREAT SERPL-MCNC: 0.94 MG/DL (ref 0.6–1.1)
EOSINOPHIL # BLD MANUAL: 0 10E3/UL (ref 0–0.7)
EOSINOPHIL NFR BLD MANUAL: 0 %
ERYTHROCYTE [DISTWIDTH] IN BLOOD BY AUTOMATED COUNT: 15.2 % (ref 10–15)
GFR SERPL CREATININE-BSD FRML MDRD: 66 ML/MIN/1.73M2
GLUCOSE BLD-MCNC: 124 MG/DL (ref 70–125)
HCT VFR BLD AUTO: 42.9 % (ref 35–47)
HGB BLD-MCNC: 13.6 G/DL (ref 11.7–15.7)
INR PPP: 1.04 (ref 0.9–1.15)
LYMPHOCYTES # BLD MANUAL: 5.1 10E3/UL (ref 0.8–5.3)
LYMPHOCYTES NFR BLD MANUAL: 32 %
MCH RBC QN AUTO: 27.1 PG (ref 26.5–33)
MCHC RBC AUTO-ENTMCNC: 31.7 G/DL (ref 31.5–36.5)
MCV RBC AUTO: 86 FL (ref 78–100)
MONOCYTES # BLD MANUAL: 2.1 10E3/UL (ref 0–1.3)
MONOCYTES NFR BLD MANUAL: 13 %
NEUTROPHILS # BLD MANUAL: 8.7 10E3/UL (ref 1.6–8.3)
NEUTROPHILS NFR BLD MANUAL: 55 %
PLAT MORPH BLD: ABNORMAL
PLATELET # BLD AUTO: 510 10E3/UL (ref 150–450)
POLYCHROMASIA BLD QL SMEAR: SLIGHT
POTASSIUM BLD-SCNC: 4.3 MMOL/L (ref 3.5–5)
PROT SERPL-MCNC: 8.3 G/DL (ref 6–8)
RBC # BLD AUTO: 5.02 10E6/UL (ref 3.8–5.2)
RBC MORPH BLD: ABNORMAL
SODIUM SERPL-SCNC: 139 MMOL/L (ref 136–145)
WBC # BLD AUTO: 15.9 10E3/UL (ref 4–11)

## 2021-08-07 PROCEDURE — 36415 COLL VENOUS BLD VENIPUNCTURE: CPT | Performed by: EMERGENCY MEDICINE

## 2021-08-07 PROCEDURE — 80053 COMPREHEN METABOLIC PANEL: CPT | Performed by: EMERGENCY MEDICINE

## 2021-08-07 PROCEDURE — 74177 CT ABD & PELVIS W/CONTRAST: CPT

## 2021-08-07 PROCEDURE — 85610 PROTHROMBIN TIME: CPT | Performed by: EMERGENCY MEDICINE

## 2021-08-07 PROCEDURE — 99285 EMERGENCY DEPT VISIT HI MDM: CPT | Mod: 25

## 2021-08-07 PROCEDURE — 85027 COMPLETE CBC AUTOMATED: CPT | Performed by: EMERGENCY MEDICINE

## 2021-08-07 PROCEDURE — 250N000011 HC RX IP 250 OP 636: Performed by: PHYSICIAN ASSISTANT

## 2021-08-07 RX ORDER — IOPAMIDOL 755 MG/ML
100 INJECTION, SOLUTION INTRAVASCULAR ONCE
Status: COMPLETED | OUTPATIENT
Start: 2021-08-07 | End: 2021-08-07

## 2021-08-07 RX ADMIN — IOPAMIDOL 100 ML: 755 INJECTION, SOLUTION INTRAVENOUS at 23:09

## 2021-08-07 ASSESSMENT — MIFFLIN-ST. JEOR: SCORE: 1647.7

## 2021-08-08 ENCOUNTER — HOSPITAL ENCOUNTER (EMERGENCY)
Facility: HOSPITAL | Age: 61
Discharge: HOME | End: 2021-08-08
Attending: EMERGENCY MEDICINE | Admitting: EMERGENCY MEDICINE
Payer: MEDICARE

## 2021-08-08 VITALS
OXYGEN SATURATION: 93 % | HEART RATE: 90 BPM | DIASTOLIC BLOOD PRESSURE: 58 MMHG | WEIGHT: 235 LBS | BODY MASS INDEX: 37.77 KG/M2 | RESPIRATION RATE: 16 BRPM | TEMPERATURE: 97 F | SYSTOLIC BLOOD PRESSURE: 122 MMHG | HEIGHT: 66 IN

## 2021-08-08 DIAGNOSIS — R10.31 RLQ ABDOMINAL PAIN: ICD-10-CM

## 2021-08-08 PROBLEM — J30.2 SEASONAL ALLERGIES: Status: ACTIVE | Noted: 2021-08-08

## 2021-08-08 PROBLEM — E11.65 HYPERGLYCEMIA DUE TO TYPE 2 DIABETES MELLITUS (H): Status: ACTIVE | Noted: 2021-08-08

## 2021-08-08 PROBLEM — R32 URINARY INCONTINENCE: Status: ACTIVE | Noted: 2020-08-20

## 2021-08-08 PROBLEM — M25.562 KNEE MENISCUS PAIN, LEFT: Status: ACTIVE | Noted: 2018-01-18

## 2021-08-08 PROBLEM — R00.0 TACHYCARDIA: Status: ACTIVE | Noted: 2017-12-19

## 2021-08-08 PROBLEM — I10 HTN (HYPERTENSION): Status: ACTIVE | Noted: 2020-08-20

## 2021-08-08 PROBLEM — M54.30 SCIATICA: Status: ACTIVE | Noted: 2021-08-08

## 2021-08-08 PROBLEM — K76.0 STEATOSIS OF LIVER: Status: ACTIVE | Noted: 2021-08-08

## 2021-08-08 PROBLEM — M43.22 FUSION OF SPINE, CERVICAL REGION: Status: ACTIVE | Noted: 2017-12-19

## 2021-08-08 PROBLEM — E11.9 CONTROLLED TYPE 2 DIABETES MELLITUS WITHOUT COMPLICATION, WITHOUT LONG-TERM CURRENT USE OF INSULIN (H): Status: ACTIVE | Noted: 2017-12-19

## 2021-08-08 PROBLEM — J45.20 ASTHMA, MILD INTERMITTENT: Status: ACTIVE | Noted: 2020-08-20

## 2021-08-08 PROBLEM — K29.60 REFLUX GASTRITIS: Status: ACTIVE | Noted: 2021-08-08

## 2021-08-08 PROBLEM — R53.1 WEAKNESS GENERALIZED: Status: ACTIVE | Noted: 2018-01-18

## 2021-08-08 PROBLEM — D70.9 NEUTROPENIA (H): Status: ACTIVE | Noted: 2021-08-08

## 2021-08-08 PROBLEM — R06.02 SOB (SHORTNESS OF BREATH): Status: ACTIVE | Noted: 2018-01-18

## 2021-08-08 PROBLEM — Z98.890 HISTORY OF KIDNEY SURGERY: Status: ACTIVE | Noted: 2017-12-19

## 2021-08-08 PROBLEM — R52 PAIN: Status: ACTIVE | Noted: 2020-08-20

## 2021-08-08 RX ORDER — ONDANSETRON 4 MG/1
4 TABLET, ORALLY DISINTEGRATING ORAL EVERY 8 HOURS PRN
Qty: 30 TABLET | Refills: 0 | Status: SHIPPED | OUTPATIENT
Start: 2021-08-08

## 2021-08-08 RX ORDER — DICYCLOMINE HCL 20 MG
20 TABLET ORAL 4 TIMES DAILY PRN
Qty: 20 TABLET | Refills: 0 | Status: SHIPPED | OUTPATIENT
Start: 2021-08-08

## 2021-08-08 ASSESSMENT — ENCOUNTER SYMPTOMS
VOMITING: 0
FLANK PAIN: 0
EYE REDNESS: 0
FEVER: 0
RHINORRHEA: 0
COLOR CHANGE: 0
DIAPHORESIS: 0
NECK STIFFNESS: 0
CONFUSION: 0
DIARRHEA: 1
DYSURIA: 1
COUGH: 0
NAUSEA: 1
ABDOMINAL PAIN: 1
ARTHRALGIAS: 0
SHORTNESS OF BREATH: 0
HEADACHES: 0
CHILLS: 0
DIFFICULTY URINATING: 0

## 2021-08-08 NOTE — ED PROVIDER NOTES
Emergency Department Encounter     Evaluation Date & Time:   8/8/2021 12:08 AM    CHIEF COMPLAINT:  Abdominal Pain and Diarrhea      Triage Note:Pt c/o rlq abd pain for 1 week accompanied by occasional diarrhea, denies vomiting        Impression and Plan     ED COURSE & MEDICAL DECISION MAKING:  ED Course as of Aug 08 0040   Sun Aug 08, 2021   0031 60yoF with history of nephrectomy, cholecystectomy, hysterectomy presenting for evaluation of RLQ pain and diarrhea. Reports 1 week of intermittent RLQ pain, nausea, bloodless diarrhea. Reports she saw PCP and found to have UTI; on 3rd day of Bactrim---states dysuria is improved; denies any fevers, sweats, chills, flank pain. Was concerned about her appendix so came to the ED. Has been tolerating PO without difficulty.    Normal vitals on presentation. Exam with mild RLQ tenderness to obese abdomen with no peritoneal signs, no CVA tenderness, clear lungs, normal work of breathing, no peripheral edema, normal neuro exam. No clinical concern for acute aortic issue. CT obtained and appendix normal; no other explanatory pathology or acute abnormality such as SBO, incarcerated hernia, perforation, colitis, diverticulitis; reassuring. Labs unremarkable as well with WBC 15 (likely from pain), normal kidney function, no glaring electrolyte abnormality. Patient declines any meds for symptoms here in the ED; states she was just concerned about her appendix.. Agreeable to try Zofran & Bentyl as outpatient for pain (avoids NSAIDs due to prior nephrectomy). Patient able to tolerate PO and walk without difficulty. Patient and daughter comfortable with discharge at this time. Return precautions and need for PCP follow up discussed and understood. Patient understood and agreed with the plan; no further questions at the time of discharge.          12:15 AM I met with the patient, obtained history, performed an initial exam, and discussed options and plan for diagnostics and treatment  here in the ED. PPE worn including N95 mask, surgical gloves, face shield.        At the conclusion of the encounter I discussed the results of all the tests and the disposition. The questions were answered. The patient or family acknowledged understanding and was agreeable with the care plan.    FINAL IMPRESSION:    ICD-10-CM    1. RLQ abdominal pain  R10.31            MEDICATIONS GIVEN IN THE EMERGENCY DEPARTMENT:  Medications   iopamidol (ISOVUE-370) solution 100 mL (100 mLs Intravenous Given 8/7/21 2309)       NEW PRESCRIPTIONS STARTED AT TODAY'S ED VISIT:  New Prescriptions    DICYCLOMINE (BENTYL) 20 MG TABLET    Take 1 tablet (20 mg) by mouth 4 times daily as needed (abdominal pain)    ONDANSETRON (ZOFRAN ODT) 4 MG ODT TAB    Take 1 tablet (4 mg) by mouth every 8 hours as needed for nausea       HPI     HPI provided by patient.        Elidia Ventura is a 60 year old female with a pertinent history of diabetes mellitus type 2, hypertension, hyperlipidemia, s/p cholecystectomy, s/p hysterectomy who presents to this ED by walk in with her daughter for evaluation of lower abdominal pain. Patient reports feeling intermittent abdominal pain across her lower abdomen for a little over 1 week. She states that when her pain flairs up, the pain comes and goes. Recently, she states her pain has been provoked by laying on her sides, in which case she get a sharp pain in her abdomen. She states she has been taking Tylenol, which has helped her. Patient also endorses vomiting and diarrhea in the past few days.    She reports dysuria. Patient was evaluated on 08/05 (3 days ago) for the evaluation of dysuria, for which she was prescribed bactrim and has been taking it twice daily for 3 days out of a 7 day course. Patient is allergic to codeine and hydrocodone, stating she has been given it in the past which made her diaphoretic and hypotensive. She also expresses reluctance to take ibuprofen.    REVIEW OF SYSTEMS:  Review of  "Systems   Constitutional: Negative for chills, diaphoresis and fever.   HENT: Negative for congestion and rhinorrhea.    Eyes: Negative for redness.   Respiratory: Negative for cough and shortness of breath.    Cardiovascular: Negative for chest pain.   Gastrointestinal: Positive for abdominal pain (across lower abdomen), diarrhea and nausea. Negative for vomiting.   Genitourinary: Positive for dysuria. Negative for difficulty urinating and flank pain.   Musculoskeletal: Negative for arthralgias and neck stiffness.   Skin: Negative for color change.   Neurological: Negative for headaches.   Psychiatric/Behavioral: Negative for confusion.   All other systems reviewed and are negative.          Medical History     History reviewed. No pertinent past medical history.    Past Surgical History:   Procedure Laterality Date     ARTHROTOMY KNEE       CHOLECYSTECTOMY       HYSTERECTOMY       NEPHRECTOMY       SPINAL FUSION         History reviewed. No pertinent family history.    Social History     Tobacco Use     Smoking status: Current Every Day Smoker     Packs/day: 0.50     Years: 25.00     Pack years: 12.50     Types: Cigarettes, Cigarettes     Smokeless tobacco: Never Used     Tobacco comment: 8 cigs per day   Substance Use Topics     Alcohol use: No     Drug use: No       dicyclomine (BENTYL) 20 MG tablet  ondansetron (ZOFRAN ODT) 4 MG ODT tab  aspirin 81 MG EC tablet  cyclobenzaprine (FLEXERIL) 10 MG tablet  metFORMIN (GLUCOPHAGE-XR) 500 MG 24 hr tablet  metoprolol tartrate (LOPRESSOR) 25 MG tablet  ranitidine (ZANTAC) 75 MG tablet        Physical Exam     First Vitals:  Patient Vitals for the past 24 hrs:   BP Temp Pulse Resp SpO2 Height Weight   08/07/21 2141 129/62 97  F (36.1  C) 96 16 97 % 1.676 m (5' 6\") 106.6 kg (235 lb)       PHYSICAL EXAM:   General:  alert, interactive, no distress  Eyes:  conjunctivae clear, conjugate gaze   HENT:  atraumatic, nose with no rhinorrhea, oropharynx clear  Neck:  no " meningismus  Cardiovascular:  HR in the 80s during exam, regular rhythm, no murmurs, brisk cap refill  Chest:  no chest wall tenderness  Chest/Pulmonary:  no stridor, normal phonation, normal work of breathing, clear lungs bilaterally  Abdomen: mild RLQ tenderness. soft, nondistended.  Back/Spine:  no CVA or midline tenderness.  Musculoskeletal:  no pretibial edema, no calf tenderness. Gross ROM intact to joints of extremities with no obvious deformities.  Skin:  warm, dry, no rash  Neuro:  awake, alert, answers questions appropriately, follows commands, moves all limbs  Psych:  calm, normal affect      Results     LAB:  All pertinent labs reviewed and interpreted  Labs Ordered and Resulted from Time of ED Arrival Up to the Time of Departure from the ED   COMPREHENSIVE METABOLIC PANEL - Abnormal; Notable for the following components:       Result Value    Chloride 108 (*)     Alkaline Phosphatase 175 (*)     Protein Total 8.3 (*)     All other components within normal limits   CBC WITH PLATELETS AND DIFFERENTIAL - Abnormal; Notable for the following components:    WBC Count 15.9 (*)     RDW 15.2 (*)     Platelet Count 510 (*)     All other components within normal limits   DIFFERENTIAL - Abnormal; Notable for the following components:    Absolute Neutrophils 8.7 (*)     Absolute Monocytes 2.1 (*)     Polychromasia Slight (*)     All other components within normal limits   INR - Normal   CBC WITH PLATELETS & DIFFERENTIAL    Narrative:     The following orders were created for panel order CBC with platelets + differential.  Procedure                               Abnormality         Status                     ---------                               -----------         ------                     CBC with platelets and d...[[id number]]  Abnormal            Final result               Manual Differential[[id number]]          Abnormal            Final result                 Please view results for these tests on the  individual orders.   LIPASE   ROUTINE UA WITH MICROSCOPIC REFLEX TO CULTURE       RADIOLOGY:  Abd/pelvis CT,  IV  contrast only TRAUMA / AAA   Final Result   IMPRESSION:    1.  No bowel obstruction or abscess. Normal appendix.   2.  Underdistention of the right colon reduces evaluation for wall thickening/early changes of colitis.   3.  Diverticulosis.   4.  Hepatic steatosis.   5.  Left renal cyst. No follow-up needed.                   I, Wes Mcmahan, am serving as a scribe to document services personally performed by Jairon Westbrook MD, based on my observations and the provider's statements to me.  I, Jairon Westbrook MD, attest that Wes Mcmahan is acting in a scribe capacity, has observed my performance of the services and has documented them in accordance with my direction.       Jairon Westbrook MD  Emergency Medicine  [facility] EMERGENCY DEPARTMENT       Jairon Westbrook MD  08/08/21 0041

## 2021-08-08 NOTE — DISCHARGE INSTRUCTIONS
As needed for pain control at home, take:  - over-the-counter acetaminophen 1g by mouth every 6 hours (max dose 4g in 24 hours)  - prescribed Bentyl for abdominal pain    Use the Zofran as needed for any nausea.    Continue your previously-prescribed Bactrim for your bladder infection.    Follow up with your Primary Care provider in 2 days for a recheck.    Return to the Emergency Department for any persistent vomiting, severe worsening, or any other concerns.

## 2021-12-12 ENCOUNTER — HOSPITAL ENCOUNTER (EMERGENCY)
Facility: HOSPITAL | Age: 61
Discharge: HOME | End: 2021-12-12
Attending: EMERGENCY MEDICINE | Admitting: EMERGENCY MEDICINE
Payer: MEDICARE

## 2021-12-12 ENCOUNTER — APPOINTMENT (OUTPATIENT)
Dept: RADIOLOGY | Facility: HOSPITAL | Age: 61
End: 2021-12-12
Attending: STUDENT IN AN ORGANIZED HEALTH CARE EDUCATION/TRAINING PROGRAM
Payer: MEDICARE

## 2021-12-12 VITALS
SYSTOLIC BLOOD PRESSURE: 124 MMHG | TEMPERATURE: 98.2 F | OXYGEN SATURATION: 96 % | RESPIRATION RATE: 16 BRPM | DIASTOLIC BLOOD PRESSURE: 60 MMHG | BODY MASS INDEX: 37.77 KG/M2 | WEIGHT: 234 LBS | HEART RATE: 92 BPM

## 2021-12-12 DIAGNOSIS — M79.674 PAIN OF TOE OF RIGHT FOOT: ICD-10-CM

## 2021-12-12 PROCEDURE — 73630 X-RAY EXAM OF FOOT: CPT | Mod: RT

## 2021-12-12 PROCEDURE — 99284 EMERGENCY DEPT VISIT MOD MDM: CPT

## 2021-12-12 NOTE — ED PROVIDER NOTES
EMERGENCY DEPARTMENT ENCOUNTER      NAME: Elidia Ventura  AGE: 61 year old female  YOB: 1960  MRN: [id number]  EVALUATION DATE & TIME: No admission date for patient encounter.    PCP: Alesia Washington    ED PROVIDER: Sapphire Vaughn M.D.      Chief Complaint   Patient presents with     Toe Pain         FINAL IMPRESSION:  1. Pain of toe of right foot          ED COURSE & MEDICAL DECISION MAKING:    ED Course as of 12/12/21 1422   Sun Dec 12, 2021   1406 Pt with pain and slight swelling to dorsal aspect of right little toe after stubbing it on furniture last night. Examination otherwise reassuring, pt declines offered tylenol/NSAID here in the ED and XR without obvious pathology present, patient and daughter aware of possibility of occult fracture with plans to closely f/u, use NSAID with tylenol PRN and RICE therapy and postop shoe to facilitate ambulation. Patient discharged after being provided with extensive anticipatory guidance and given return precautions, importance of PMD follow-up emphasized.       1:57 PM I met with the patient and performed my initial exam.  I discussed the plan for care and the patient is agreeable with this plan. I discussed the plan for discharge with the patient, and patient is agreeable. We discussed supportivecares at home and reasons for return to the ER including new or worsening symptoms - all questions and concerns addressed. Patient to be discharged by RN.     Pertinent Labs & Imaging studies reviewed. (See chart for details)    N95 worn  A face shield was worn also  COVID PPE      At the conclusion of the encounter I discussed the results of all of the tests and the disposition. The questions were answered. The patient or family acknowledged understanding and was agreeable with the care plan.     MEDICATIONS GIVEN IN THE EMERGENCY:  Medications - No data to display    NEW PRESCRIPTIONS STARTED AT TODAY'S ER VISIT  New Prescriptions    No medications on file           =================================================================    HPI      Elidia Ventura is a 61 year old female with PMHx of asthma, type II diabetes mellitus, hypertension, neutropenia who presents to the ED today via by personal vehicle with toe pain.    The patient presents to the ED reporting that she stubbed her right little toe on her bed post last night and describes her pain as nonradiating moderately achy.  She took tylenol with some resolve.  Today, her toe was swollen so her daughter brought her to the ED for workup.      The patient denies any other symptoms at this time.       REVIEW OF SYSTEMS   All other systems reviewed and are negative except as noted above in HPI.    PAST MEDICAL HISTORY:  No past medical history on file.    PAST SURGICAL HISTORY:  Past Surgical History:   Procedure Laterality Date     ARTHROTOMY KNEE       CHOLECYSTECTOMY       HYSTERECTOMY       NEPHRECTOMY       SPINAL FUSION         CURRENT MEDICATIONS:    aspirin 81 MG EC tablet  cyclobenzaprine (FLEXERIL) 10 MG tablet  dicyclomine (BENTYL) 20 MG tablet  metFORMIN (GLUCOPHAGE-XR) 500 MG 24 hr tablet  metoprolol tartrate (LOPRESSOR) 25 MG tablet  ondansetron (ZOFRAN ODT) 4 MG ODT tab  ranitidine (ZANTAC) 75 MG tablet        ALLERGIES:  Allergies   Allergen Reactions     Codeine Unknown     Low blood pressure     Hydrocodone Unknown     Penicillins Swelling       FAMILY HISTORY:  No family history on file.    SOCIAL HISTORY:   Social History     Socioeconomic History     Marital status:      Spouse name: Not on file     Number of children: Not on file     Years of education: Not on file     Highest education level: Not on file   Occupational History     Not on file   Tobacco Use     Smoking status: Current Every Day Smoker     Packs/day: 0.50     Years: 25.00     Pack years: 12.50     Types: Cigarettes, Cigarettes     Smokeless tobacco: Never Used     Tobacco comment: 8 cigs per day   Substance and Sexual  Activity     Alcohol use: No     Drug use: No     Sexual activity: Never   Other Topics Concern     Not on file   Social History Narrative     Not on file     Social Determinants of Health     Financial Resource Strain: Not on file   Food Insecurity: Not on file   Transportation Needs: Not on file   Physical Activity: Not on file   Stress: Not on file   Social Connections: Not on file   Intimate Partner Violence: Not on file   Housing Stability: Not on file       VITALS:  Patient Vitals for the past 24 hrs:   BP Temp Temp src Pulse Resp SpO2 Weight   12/12/21 1329 124/60 98.2  F (36.8  C) Oral 92 16 96 % 106.1 kg (234 lb)       PHYSICAL EXAM    GENERAL: Awake, alert.  In no acute distress.   HEENT: Normocephalic, atraumatic.  Pupils equal, round and reactive.  Conjunctiva normal.  EOMI.  NECK: No stridor or apparent deformity.  EXTREMITIES: No lower extremity edema.  Tenderness to the dorsal aspect of right little toe.  NEURO: Alert and oriented to person, place and time.  Cranial nerves grossly intact.  No focal motor deficit.  PSYCH: Normal mood and affect  SKIN: No rashes. No skin breaks at little toe.        I, August Ibarra, am serving as a scribe to document services personally performed by Dr. Sapphire Vaughn based on my observation and the provider's statements to me. I, Sapphire Vaughn MD attest that August Ibarra is acting in a scribe capacity, has observed my performance of the services and has documented them in accordance with my direction.     Sapphire Vaughn MD  12/12/21 9059

## 2021-12-12 NOTE — ED TRIAGE NOTES
Patient arrives to triage from home with chief complaint of right foot and toe pain.  Patient reports hitting her foot and toe into a metal bed post yesterday.  Patient reports being able to put weight on her right heel but not the rest of the foot.  Pulses palpable, no obvious swelling, slight movement in fifth toe and painful to touch.  Patient is alert and oriented x4.

## 2022-03-09 ENCOUNTER — LAB REQUISITION (OUTPATIENT)
Dept: LAB | Facility: CLINIC | Age: 62
End: 2022-03-09
Payer: MEDICARE

## 2022-03-09 ENCOUNTER — TRANSFERRED RECORDS (OUTPATIENT)
Dept: HEALTH INFORMATION MANAGEMENT | Facility: CLINIC | Age: 62
End: 2022-03-09

## 2022-03-09 DIAGNOSIS — D47.9 NEOPLASM OF UNCERTAIN BEHAVIOR OF LYMPHOID, HEMATOPOIETIC AND RELATED TISSUE, UNSPECIFIED (H): ICD-10-CM

## 2022-03-09 DIAGNOSIS — D70.9 NEUTROPENIA, UNSPECIFIED (H): ICD-10-CM

## 2022-03-09 DIAGNOSIS — E11.65 TYPE 2 DIABETES MELLITUS WITH HYPERGLYCEMIA (H): ICD-10-CM

## 2022-03-09 DIAGNOSIS — Z91.89 OTHER SPECIFIED PERSONAL RISK FACTORS, NOT ELSEWHERE CLASSIFIED: ICD-10-CM

## 2022-03-09 LAB
ALBUMIN SERPL-MCNC: 3.7 G/DL (ref 3.5–5)
ALP SERPL-CCNC: 181 U/L (ref 45–120)
ALT SERPL W P-5'-P-CCNC: 25 U/L (ref 0–45)
ANION GAP SERPL CALCULATED.3IONS-SCNC: 11 MMOL/L (ref 5–18)
AST SERPL W P-5'-P-CCNC: 19 U/L (ref 0–40)
BILIRUB SERPL-MCNC: 0.7 MG/DL (ref 0–1)
BUN SERPL-MCNC: 9 MG/DL (ref 8–22)
CALCIUM SERPL-MCNC: 9.5 MG/DL (ref 8.5–10.5)
CHLORIDE BLD-SCNC: 106 MMOL/L (ref 98–107)
CHOLEST SERPL-MCNC: 144 MG/DL
CO2 SERPL-SCNC: 23 MMOL/L (ref 22–31)
CREAT SERPL-MCNC: 0.82 MG/DL (ref 0.6–1.1)
ERYTHROCYTE [DISTWIDTH] IN BLOOD BY AUTOMATED COUNT: 14.6 % (ref 10–15)
GFR SERPL CREATININE-BSD FRML MDRD: 80 ML/MIN/1.73M2
GLUCOSE BLD-MCNC: 170 MG/DL (ref 70–125)
HCT VFR BLD AUTO: 45.2 % (ref 35–47)
HDLC SERPL-MCNC: 45 MG/DL
HGB BLD-MCNC: 14.1 G/DL (ref 11.7–15.7)
LDLC SERPL CALC-MCNC: 84 MG/DL
MCH RBC QN AUTO: 27.6 PG (ref 26.5–33)
MCHC RBC AUTO-ENTMCNC: 31.2 G/DL (ref 31.5–36.5)
MCV RBC AUTO: 89 FL (ref 78–100)
PLATELET # BLD AUTO: 463 10E3/UL (ref 150–450)
POTASSIUM BLD-SCNC: 4.5 MMOL/L (ref 3.5–5)
PROT SERPL-MCNC: 7.7 G/DL (ref 6–8)
RBC # BLD AUTO: 5.11 10E6/UL (ref 3.8–5.2)
SODIUM SERPL-SCNC: 140 MMOL/L (ref 136–145)
TRIGL SERPL-MCNC: 74 MG/DL
WBC # BLD AUTO: 14.8 10E3/UL (ref 4–11)

## 2022-03-09 PROCEDURE — 85027 COMPLETE CBC AUTOMATED: CPT | Mod: ORL | Performed by: NURSE PRACTITIONER

## 2022-03-09 PROCEDURE — 80053 COMPREHEN METABOLIC PANEL: CPT | Mod: ORL | Performed by: NURSE PRACTITIONER

## 2022-03-09 PROCEDURE — 80061 LIPID PANEL: CPT | Mod: ORL | Performed by: NURSE PRACTITIONER

## 2022-04-04 ENCOUNTER — TRANSCRIBE ORDERS (OUTPATIENT)
Dept: OTHER | Age: 62
End: 2022-04-04
Payer: COMMERCIAL

## 2022-04-04 ENCOUNTER — PATIENT OUTREACH (OUTPATIENT)
Dept: ONCOLOGY | Facility: CLINIC | Age: 62
End: 2022-04-04
Payer: COMMERCIAL

## 2022-04-04 DIAGNOSIS — D47.9 ATYPICAL LYMPHOPROLIFERATIVE DISORDER (H): Primary | ICD-10-CM

## 2022-04-04 NOTE — PROGRESS NOTES
Received fax referral for atypical lymphoproliferative disorder but referral did not include records. Referral marked for records needed, will update scheduling instructions once received.

## 2022-04-05 ENCOUNTER — DOCUMENTATION ONLY (OUTPATIENT)
Dept: ONCOLOGY | Facility: CLINIC | Age: 62
End: 2022-04-05
Payer: COMMERCIAL

## 2022-04-05 NOTE — PROGRESS NOTES
Action April 5, 2022 9:34 AM [facility]   Action Taken Record and image request sent to [facility]    4:52 PM  Records from [facility] received and sent to HIM for upload

## 2022-04-07 NOTE — PROGRESS NOTES
Patient previously seen by Dr Colindres at [facility]. Referral instructions updated for next available with same care team as new patient as more than 3 years has lapsed.

## 2022-04-15 ENCOUNTER — LAB REQUISITION (OUTPATIENT)
Dept: LAB | Facility: CLINIC | Age: 62
End: 2022-04-15
Payer: MEDICARE

## 2022-04-15 DIAGNOSIS — R51.9 HEADACHE, UNSPECIFIED: ICD-10-CM

## 2022-04-15 LAB — ERYTHROCYTE [SEDIMENTATION RATE] IN BLOOD BY WESTERGREN METHOD: 28 MM/HR (ref 0–20)

## 2022-04-15 PROCEDURE — 85652 RBC SED RATE AUTOMATED: CPT | Mod: ORL | Performed by: FAMILY MEDICINE

## 2022-04-15 PROCEDURE — 87086 URINE CULTURE/COLONY COUNT: CPT | Mod: ORL | Performed by: FAMILY MEDICINE

## 2022-04-17 LAB — BACTERIA UR CULT: NO GROWTH

## 2022-04-18 NOTE — PROGRESS NOTES
RECORDS STATUS - ALL OTHER DIAGNOSIS      [facility]    DATE RECEIVED: 4/26/2022   NOTES STATUS DETAILS   OFFICE NOTE from referring provider Complete Alesia Banuelos NP   OFFICE NOTE from medical oncologist Complete CE- 10/9/2018    Atypical lymphoproliferative disorder (H)   More in Epic/CE   DISCHARGE SUMMARY from hospital     DISCHARGE REPORT from the ER     MEDICATION LIST Complete [facility]   CLINICAL TRIAL TREATMENTS TO DATE     LABS     PATHOLOGY REPORTS     ANYTHING RELATED TO DIAGNOSIS Complete Labs last updated on 4/15/2022   GENONOMIC TESTING     TYPE:     IMAGING (NEED IMAGES & REPORT)     CT SCANS     MRI     MAMMO     ULTRASOUND     PET

## 2022-04-26 ENCOUNTER — PRE VISIT (OUTPATIENT)
Dept: ONCOLOGY | Facility: HOSPITAL | Age: 62
End: 2022-04-26

## 2022-05-12 NOTE — PROGRESS NOTES
Patient scheduled for consult with Dr Colindres on 5/10 but did not attend or cancel appointment. Request for no-show letter initiated and referral resent to [employer]PS for completion of reschedule.

## 2022-05-17 NOTE — PROGRESS NOTES
RECORDS STATUS - ALL OTHER DIAGNOSIS      RECORDS RECEIVED FROM: [facility]   DATE RECEIVED: 5/24/2022   NOTES STATUS DETAILS   OFFICE NOTE from referring provider Complete Alesia Banuelos NP   OFFICE NOTE from medical oncologist Complete 10/9/2018    Atypical lymphoproliferative disorder (H)     3/14/2018    Atypical lymphoproliferative disorder (H)    More in EPIC   DISCHARGE SUMMARY from hospital     DISCHARGE REPORT from the ER     MEDICATION LIST Complete [facility]   CLINICAL TRIAL TREATMENTS TO DATE     LABS     PATHOLOGY REPORTS     ANYTHING RELATED TO DIAGNOSIS Complete Labs last updated on 4/15/2022   GENONOMIC TESTING     TYPE:     IMAGING (NEED IMAGES & REPORT)     CT SCANS Complete CT Abdomen Pelvis 8/7/2021   MRI     MAMMO     ULTRASOUND     PET

## 2022-05-24 ENCOUNTER — LAB (OUTPATIENT)
Dept: INFUSION THERAPY | Facility: HOSPITAL | Age: 62
End: 2022-05-24
Attending: INTERNAL MEDICINE
Payer: MEDICARE

## 2022-05-24 ENCOUNTER — ONCOLOGY VISIT (OUTPATIENT)
Dept: ONCOLOGY | Facility: HOSPITAL | Age: 62
End: 2022-05-24
Attending: INTERNAL MEDICINE
Payer: MEDICARE

## 2022-05-24 ENCOUNTER — PRE VISIT (OUTPATIENT)
Dept: ONCOLOGY | Facility: HOSPITAL | Age: 62
End: 2022-05-24

## 2022-05-24 VITALS
HEART RATE: 104 BPM | WEIGHT: 235.3 LBS | OXYGEN SATURATION: 97 % | DIASTOLIC BLOOD PRESSURE: 77 MMHG | TEMPERATURE: 97.9 F | BODY MASS INDEX: 37.82 KG/M2 | HEIGHT: 66 IN | SYSTOLIC BLOOD PRESSURE: 139 MMHG | RESPIRATION RATE: 24 BRPM

## 2022-05-24 DIAGNOSIS — D72.828 NEUTROPHILIA: Primary | ICD-10-CM

## 2022-05-24 DIAGNOSIS — D47.9 ATYPICAL LYMPHOPROLIFERATIVE DISORDER (H): ICD-10-CM

## 2022-05-24 DIAGNOSIS — D75.839 THROMBOCYTOSIS: ICD-10-CM

## 2022-05-24 LAB
BASOPHILS # BLD AUTO: 0.1 10E3/UL (ref 0–0.2)
BASOPHILS NFR BLD AUTO: 0 %
EOSINOPHIL # BLD AUTO: 0.1 10E3/UL (ref 0–0.7)
EOSINOPHIL NFR BLD AUTO: 1 %
ERYTHROCYTE [DISTWIDTH] IN BLOOD BY AUTOMATED COUNT: 14.9 % (ref 10–15)
HCT VFR BLD AUTO: 45.2 % (ref 35–47)
HGB BLD-MCNC: 14.1 G/DL (ref 11.7–15.7)
IMM GRANULOCYTES # BLD: 0.1 10E3/UL
IMM GRANULOCYTES NFR BLD: 0 %
INTERPRETATION: NORMAL
LYMPHOCYTES # BLD AUTO: 4.6 10E3/UL (ref 0.8–5.3)
LYMPHOCYTES NFR BLD AUTO: 34 %
MCH RBC QN AUTO: 27.1 PG (ref 26.5–33)
MCHC RBC AUTO-ENTMCNC: 31.2 G/DL (ref 31.5–36.5)
MCV RBC AUTO: 87 FL (ref 78–100)
MONOCYTES # BLD AUTO: 1.2 10E3/UL (ref 0–1.3)
MONOCYTES NFR BLD AUTO: 9 %
NEUTROPHILS # BLD AUTO: 7.5 10E3/UL (ref 1.6–8.3)
NEUTROPHILS NFR BLD AUTO: 56 %
NRBC # BLD AUTO: 0 10E3/UL
NRBC BLD AUTO-RTO: 0 /100
PLATELET # BLD AUTO: 451 10E3/UL (ref 150–450)
RBC # BLD AUTO: 5.2 10E6/UL (ref 3.8–5.2)
SIGNIFICANT RESULTS: NORMAL
SPECIMEN DESCRIPTION: NORMAL
TEST DETAILS, MDL: NORMAL
WBC # BLD AUTO: 13.5 10E3/UL (ref 4–11)

## 2022-05-24 PROCEDURE — 36415 COLL VENOUS BLD VENIPUNCTURE: CPT

## 2022-05-24 PROCEDURE — 81403 MOPATH PROCEDURE LEVEL 4: CPT

## 2022-05-24 PROCEDURE — 99204 OFFICE O/P NEW MOD 45 MIN: CPT | Performed by: INTERNAL MEDICINE

## 2022-05-24 PROCEDURE — 81450 HL NEO GSAP 5-50DNA/DNA&RNA: CPT

## 2022-05-24 PROCEDURE — 85025 COMPLETE CBC W/AUTO DIFF WBC: CPT

## 2022-05-24 PROCEDURE — G0463 HOSPITAL OUTPT CLINIC VISIT: HCPCS

## 2022-05-24 RX ORDER — GLIPIZIDE 5 MG/1
5 TABLET, FILM COATED, EXTENDED RELEASE ORAL DAILY
COMMUNITY
Start: 2022-04-04

## 2022-05-24 RX ORDER — BLOOD SUGAR DIAGNOSTIC
STRIP MISCELLANEOUS
COMMUNITY

## 2022-05-24 RX ORDER — BLOOD SUGAR DIAGNOSTIC
STRIP MISCELLANEOUS
COMMUNITY
Start: 2022-04-04

## 2022-05-24 ASSESSMENT — PAIN SCALES - GENERAL: PAINLEVEL: NO PAIN (0)

## 2022-05-24 NOTE — PROGRESS NOTES
[facility] Hematology and Oncology Consult Note      Patient: Elidia Ventura  MRN: [id number]  Date of Service: May 24, 2022      We have been asked by Dr. Washington to evaluate Elidia Ventura for an elevated white blood cell count and platelet count.    Assessment/Plan:    1.  Elevated white blood cell count and platelet count: Chronic, stable.  Numbers now are stable compared to those from 4 years ago.  Recent white blood cell differential shows a slightly elevated neutrophil count and monocyte count.  She had two peripheral blood flow cytometry studies done in 2018  by 7 months which were both unremarkable. Today we can check JAK2/CALR/MPL gene mutations.  We can also send peripheral blood for next generation sequencing.  Regardless of the results the patient does not need any therapy at this time.  Her cytosis is very mild and is not causing her any symptoms.  This was all explained to Elidia.  Questions were answered.  We will follow in clinic yearly.    ECOG Performance  0    Staging History:    Cancer Staging  No matching staging information was found for the patient.    History:    Elidia is referred for an elevated white count and platelet count.  I had seen her in clinic a few times in 2018 for the same problem.  At that time she had peripheral blood flow cytometry done twice which was unremarkable.  She had body imaging done in March 2019 which did not show splenomegaly or adenopathy in the abdomen or pelvis.  She had another CT abdomen pelvis with contrast in August 2021 which showed no splenomegaly, lymphadenopathy, or other evidence of a lymphoproliferative disorder.  She is referred back now for follow-up.  Overall she has been doing okay.  No major changes in her health recently.  No acute complaints today.  No fevers, chills, night sweats.  No hot flashes, flushing, or rash.    Past History:    No past medical history on file.   Elevated white blood cell count  Thrombocytosis     No  family history on file.   No known family history of blood dyscrasia   [unfilled] Social History     Socioeconomic History     Marital status:      Spouse name: Not on file     Number of children: Not on file     Years of education: Not on file     Highest education level: Not on file   Occupational History     Not on file   Tobacco Use     Smoking status: Current Every Day Smoker     Packs/day: 0.50     Years: 25.00     Pack years: 12.50     Types: Cigarettes, Cigarettes     Smokeless tobacco: Never Used     Tobacco comment: 8 cigs per day   Substance and Sexual Activity     Alcohol use: No     Drug use: No     Sexual activity: Never   Other Topics Concern     Not on file   Social History Narrative     Not on file     Social Determinants of Health     Financial Resource Strain: Not on file   Food Insecurity: Not on file   Transportation Needs: Not on file   Physical Activity: Not on file   Stress: Not on file   Social Connections: Not on file   Intimate Partner Violence: Not on file   Housing Stability: Not on file        Allergies:    Allergies   Allergen Reactions     Penicillins Anaphylaxis, Hives and Swelling     Other reaction(s): anaphylaxis, Angioedema     Codeine Unknown     Low blood pressure     Hydrocodone Unknown     Hydrocodone-Acetaminophen      Other reaction(s): *Unknown     Sulfamethoxazole      Other reaction(s): *Unknown     Review of Systems:    As above in the history.     Review of Systems otherwise Negative for:  General: chills, fever or night sweats  Psychological: anxiety or depression  Ophthalmic: blurry vision, double vision or loss of vision, vision change  ENT: epistaxis, oral lesions, hearing changes  Hematological and Lymphatic: bleeding, bruising, jaundice, swollen lymph nodes  Endocrine: hot flashes, unexpected weight changes  Respiratory: cough, hemoptysis, orthopnea or shortness of breath/RIVERS  Cardiovascular: chest pain, edema, palpitations or PND  Gastrointestinal:  "abdominal pain, constipation, diarrhea or nausea/vomiting  Genito-Urinary: change in urinary stream, incontinence, frequency/urgency  Musculoskeletal: joint pain, stiffness, swelling, muscle pain  Neurological: dizziness, headaches, numbness/tingling  Dermatological: lumps and rash    Physical Exam:    /77 (BP Location: Right arm, Patient Position: Sitting, Cuff Size: Adult Large)   Pulse 104   Temp 97.9  F (36.6  C) (Oral)   Resp 24   Ht 1.67 m (5' 5.75\")   Wt 106.7 kg (235 lb 4.8 oz)   SpO2 97%   BMI 38.27 kg/m      General: patient appears stated age of 61 year old. Nontoxic and in no distress.   HEENT: Head: atraumatic, normocephalic. Sclerae anicteric.  Chest:  Normal respiratory effort  Cardiac:  No edema.   Abdomen: abdomen is non-distended  Extremities: normal tone and muscle bulk.   Skin: no lesions or rash on visible skin. Warm and dry.   CNS: alert and oriented. Grossly non-focal.   Psychiatric: normal mood and affect.     Lab Results:    Recent Results (from the past 168 hour(s))   CBC with platelets and differential   Result Value Ref Range    WBC Count 13.5 (H) 4.0 - 11.0 10e3/uL    RBC Count 5.20 3.80 - 5.20 10e6/uL    Hemoglobin 14.1 11.7 - 15.7 g/dL    Hematocrit 45.2 35.0 - 47.0 %    MCV 87 78 - 100 fL    MCH 27.1 26.5 - 33.0 pg    MCHC 31.2 (L) 31.5 - 36.5 g/dL    RDW 14.9 10.0 - 15.0 %    Platelet Count 451 (H) 150 - 450 10e3/uL    % Neutrophils 56 %    % Lymphocytes 34 %    % Monocytes 9 %    % Eosinophils 1 %    % Basophils 0 %    % Immature Granulocytes 0 %    NRBCs per 100 WBC 0 <1 /100    Absolute Neutrophils 7.5 1.6 - 8.3 10e3/uL    Absolute Lymphocytes 4.6 0.8 - 5.3 10e3/uL    Absolute Monocytes 1.2 0.0 - 1.3 10e3/uL    Absolute Eosinophils 0.1 0.0 - 0.7 10e3/uL    Absolute Basophils 0.1 0.0 - 0.2 10e3/uL    Absolute Immature Granulocytes 0.1 <=0.4 10e3/uL    Absolute NRBCs 0.0 10e3/uL     Imaging Results:    No results found.      Signed by: Luigi Colindres MD    "

## 2022-05-24 NOTE — PROGRESS NOTES
"Oncology Rooming Note    May 24, 2022 11:26 AM   Elidia Ventura is a 61 year old female who presents for:    Chief Complaint   Patient presents with     Oncology Clinic Visit     New Patient - Atypical lymphoproliferative disorder     Initial Vitals: /77 (BP Location: Right arm, Patient Position: Sitting, Cuff Size: Adult Large)   Pulse 104   Temp 97.9  F (36.6  C) (Oral)   Resp 24   Ht 1.67 m (5' 5.75\")   Wt 106.7 kg (235 lb 4.8 oz)   SpO2 97%   BMI 38.27 kg/m   Estimated body mass index is 38.27 kg/m  as calculated from the following:    Height as of this encounter: 1.67 m (5' 5.75\").    Weight as of this encounter: 106.7 kg (235 lb 4.8 oz). Body surface area is 2.22 meters squared.  No Pain (0) Comment: Data Unavailable   No LMP recorded.  Allergies reviewed: Yes  Medications reviewed: Yes    Medications: Medication refills not needed today.  Pharmacy name entered into [facility]: [id number] - [place], [place] - [address] AT [facility] [address]    Clinical concerns: New Patient - Atypical lymphoproliferative disorder.      Diane Hong CMA              "

## 2022-05-31 LAB
INTERPRETATION: NORMAL
SIGNIFICANT RESULTS: NORMAL
SPECIMEN DESCRIPTION: NORMAL
TEST DETAILS, MDL: NORMAL

## 2022-05-31 PROCEDURE — G0452 MOLECULAR PATHOLOGY INTERPR: HCPCS | Mod: 26 | Performed by: PATHOLOGY

## 2022-06-06 ENCOUNTER — PATIENT OUTREACH (OUTPATIENT)
Dept: ONCOLOGY | Facility: HOSPITAL | Age: 62
End: 2022-06-06
Payer: COMMERCIAL

## 2022-06-06 NOTE — PROGRESS NOTES
[facility]: Cancer Care                                                                                          Called Elidia to discuss recent labs.  Dr. Colindres said they are all normal and nothing in the bone marrow to give evidence for elevated counts.  He'd like to continue the plan and see her in a year.    Signature:  Kulwant French, RNCC

## 2022-07-08 ENCOUNTER — LAB REQUISITION (OUTPATIENT)
Dept: LAB | Facility: CLINIC | Age: 62
End: 2022-07-08
Payer: MEDICARE

## 2022-07-08 DIAGNOSIS — R70.0 ELEVATED ERYTHROCYTE SEDIMENTATION RATE: ICD-10-CM

## 2022-07-08 DIAGNOSIS — R00.0 TACHYCARDIA, UNSPECIFIED: ICD-10-CM

## 2022-07-08 LAB
ALBUMIN SERPL BCG-MCNC: 4.3 G/DL (ref 3.5–5.2)
ALP SERPL-CCNC: 163 U/L (ref 35–104)
ALT SERPL W P-5'-P-CCNC: 24 U/L (ref 10–35)
ANION GAP SERPL CALCULATED.3IONS-SCNC: 10 MMOL/L (ref 7–15)
AST SERPL W P-5'-P-CCNC: 20 U/L (ref 10–35)
BILIRUB SERPL-MCNC: 0.5 MG/DL
BUN SERPL-MCNC: 12.8 MG/DL (ref 8–23)
CALCIUM SERPL-MCNC: 9.5 MG/DL (ref 8.8–10.2)
CHLORIDE SERPL-SCNC: 107 MMOL/L (ref 98–107)
CREAT SERPL-MCNC: 0.77 MG/DL (ref 0.51–0.95)
DEPRECATED HCO3 PLAS-SCNC: 24 MMOL/L (ref 22–29)
ERYTHROCYTE [SEDIMENTATION RATE] IN BLOOD BY WESTERGREN METHOD: 32 MM/HR (ref 0–30)
GFR SERPL CREATININE-BSD FRML MDRD: 87 ML/MIN/1.73M2
GLUCOSE SERPL-MCNC: 139 MG/DL (ref 70–99)
POTASSIUM SERPL-SCNC: 4.2 MMOL/L (ref 3.4–5.3)
PROT SERPL-MCNC: 7.8 G/DL (ref 6.4–8.3)
SODIUM SERPL-SCNC: 141 MMOL/L (ref 136–145)
T4 FREE SERPL-MCNC: 0.92 NG/DL (ref 0.9–1.7)
TSH SERPL DL<=0.005 MIU/L-ACNC: 0.63 UIU/ML (ref 0.3–4.2)

## 2022-07-08 PROCEDURE — 84439 ASSAY OF FREE THYROXINE: CPT | Mod: ORL | Performed by: NURSE PRACTITIONER

## 2022-07-08 PROCEDURE — 85652 RBC SED RATE AUTOMATED: CPT | Mod: ORL | Performed by: NURSE PRACTITIONER

## 2022-07-08 PROCEDURE — 84443 ASSAY THYROID STIM HORMONE: CPT | Mod: ORL | Performed by: NURSE PRACTITIONER

## 2022-07-08 PROCEDURE — 80053 COMPREHEN METABOLIC PANEL: CPT | Mod: ORL | Performed by: NURSE PRACTITIONER

## 2022-07-14 ENCOUNTER — APPOINTMENT (OUTPATIENT)
Dept: RADIOLOGY | Facility: HOSPITAL | Age: 62
End: 2022-07-14
Attending: PHYSICIAN ASSISTANT
Payer: MEDICARE

## 2022-07-14 ENCOUNTER — HOSPITAL ENCOUNTER (EMERGENCY)
Facility: HOSPITAL | Age: 62
Discharge: HOME | End: 2022-07-14
Admitting: PHYSICIAN ASSISTANT
Payer: MEDICARE

## 2022-07-14 VITALS
RESPIRATION RATE: 16 BRPM | HEART RATE: 78 BPM | OXYGEN SATURATION: 96 % | TEMPERATURE: 97.2 F | DIASTOLIC BLOOD PRESSURE: 55 MMHG | HEIGHT: 65 IN | BODY MASS INDEX: 38.32 KG/M2 | WEIGHT: 230 LBS | SYSTOLIC BLOOD PRESSURE: 120 MMHG

## 2022-07-14 DIAGNOSIS — R07.9 CHEST PAIN, UNSPECIFIED TYPE: ICD-10-CM

## 2022-07-14 DIAGNOSIS — M54.6 ACUTE BILATERAL THORACIC BACK PAIN: ICD-10-CM

## 2022-07-14 LAB
ALBUMIN SERPL-MCNC: 3.7 G/DL (ref 3.5–5)
ALBUMIN UR-MCNC: NEGATIVE MG/DL
ALP SERPL-CCNC: 168 U/L (ref 45–120)
ALT SERPL W P-5'-P-CCNC: 24 U/L (ref 0–45)
ANION GAP SERPL CALCULATED.3IONS-SCNC: 9 MMOL/L (ref 5–18)
APPEARANCE UR: CLEAR
AST SERPL W P-5'-P-CCNC: 20 U/L (ref 0–40)
BACTERIA #/AREA URNS HPF: ABNORMAL /HPF
BASOPHILS # BLD AUTO: 0.1 10E3/UL (ref 0–0.2)
BASOPHILS NFR BLD AUTO: 1 %
BILIRUB SERPL-MCNC: 0.3 MG/DL (ref 0–1)
BILIRUB UR QL STRIP: NEGATIVE
BUN SERPL-MCNC: 18 MG/DL (ref 8–22)
CALCIUM SERPL-MCNC: 9.7 MG/DL (ref 8.5–10.5)
CHLORIDE BLD-SCNC: 106 MMOL/L (ref 98–107)
CO2 SERPL-SCNC: 22 MMOL/L (ref 22–31)
COLOR UR AUTO: ABNORMAL
CREAT SERPL-MCNC: 1.2 MG/DL (ref 0.6–1.1)
D DIMER PPP FEU-MCNC: 0.49 UG/ML FEU (ref 0–0.5)
EOSINOPHIL # BLD AUTO: 0.2 10E3/UL (ref 0–0.7)
EOSINOPHIL NFR BLD AUTO: 1 %
ERYTHROCYTE [DISTWIDTH] IN BLOOD BY AUTOMATED COUNT: 15.8 % (ref 10–15)
GFR SERPL CREATININE-BSD FRML MDRD: 51 ML/MIN/1.73M2
GLUCOSE BLD-MCNC: 227 MG/DL (ref 70–125)
GLUCOSE UR STRIP-MCNC: NEGATIVE MG/DL
HCT VFR BLD AUTO: 46.2 % (ref 35–47)
HGB BLD-MCNC: 14.5 G/DL (ref 11.7–15.7)
HGB UR QL STRIP: NEGATIVE
IMM GRANULOCYTES # BLD: 0.2 10E3/UL
IMM GRANULOCYTES NFR BLD: 1 %
KETONES UR STRIP-MCNC: NEGATIVE MG/DL
LEUKOCYTE ESTERASE UR QL STRIP: NEGATIVE
LIPASE SERPL-CCNC: 20 U/L (ref 0–52)
LYMPHOCYTES # BLD AUTO: 5.9 10E3/UL (ref 0.8–5.3)
LYMPHOCYTES NFR BLD AUTO: 35 %
MCH RBC QN AUTO: 27.4 PG (ref 26.5–33)
MCHC RBC AUTO-ENTMCNC: 31.4 G/DL (ref 31.5–36.5)
MCV RBC AUTO: 87 FL (ref 78–100)
MONOCYTES # BLD AUTO: 1.8 10E3/UL (ref 0–1.3)
MONOCYTES NFR BLD AUTO: 11 %
NEUTROPHILS # BLD AUTO: 8.8 10E3/UL (ref 1.6–8.3)
NEUTROPHILS NFR BLD AUTO: 51 %
NITRATE UR QL: NEGATIVE
NRBC # BLD AUTO: 0 10E3/UL
NRBC BLD AUTO-RTO: 0 /100
PH UR STRIP: 5.5 [PH] (ref 5–7)
PLAT MORPH BLD: NORMAL
PLATELET # BLD AUTO: 448 10E3/UL (ref 150–450)
POTASSIUM BLD-SCNC: 4.5 MMOL/L (ref 3.5–5)
PROT SERPL-MCNC: 8.6 G/DL (ref 6–8)
RBC # BLD AUTO: 5.3 10E6/UL (ref 3.8–5.2)
RBC MORPH BLD: NORMAL
RBC URINE: 1 /HPF
SODIUM SERPL-SCNC: 137 MMOL/L (ref 136–145)
SP GR UR STRIP: 1.02 (ref 1–1.03)
SQUAMOUS EPITHELIAL: 1 /HPF
TROPONIN I SERPL-MCNC: <0.01 NG/ML (ref 0–0.29)
UROBILINOGEN UR STRIP-MCNC: <2 MG/DL
WBC # BLD AUTO: 16.8 10E3/UL (ref 4–11)
WBC URINE: 1 /HPF

## 2022-07-14 PROCEDURE — 250N000013 HC RX MED GY IP 250 OP 250 PS 637: Performed by: PHYSICIAN ASSISTANT

## 2022-07-14 PROCEDURE — 93005 ELECTROCARDIOGRAM TRACING: CPT | Performed by: PHYSICIAN ASSISTANT

## 2022-07-14 PROCEDURE — 81001 URINALYSIS AUTO W/SCOPE: CPT | Performed by: PHYSICIAN ASSISTANT

## 2022-07-14 PROCEDURE — 84484 ASSAY OF TROPONIN QUANT: CPT | Performed by: PHYSICIAN ASSISTANT

## 2022-07-14 PROCEDURE — 83690 ASSAY OF LIPASE: CPT | Performed by: PHYSICIAN ASSISTANT

## 2022-07-14 PROCEDURE — 85379 FIBRIN DEGRADATION QUANT: CPT | Performed by: PHYSICIAN ASSISTANT

## 2022-07-14 PROCEDURE — 71046 X-RAY EXAM CHEST 2 VIEWS: CPT

## 2022-07-14 PROCEDURE — 99285 EMERGENCY DEPT VISIT HI MDM: CPT | Mod: 25

## 2022-07-14 PROCEDURE — 36415 COLL VENOUS BLD VENIPUNCTURE: CPT | Performed by: PHYSICIAN ASSISTANT

## 2022-07-14 PROCEDURE — 80053 COMPREHEN METABOLIC PANEL: CPT | Performed by: PHYSICIAN ASSISTANT

## 2022-07-14 PROCEDURE — 85025 COMPLETE CBC W/AUTO DIFF WBC: CPT | Performed by: PHYSICIAN ASSISTANT

## 2022-07-14 RX ORDER — ACETAMINOPHEN 325 MG/1
650 TABLET ORAL ONCE
Status: COMPLETED | OUTPATIENT
Start: 2022-07-14 | End: 2022-07-14

## 2022-07-14 RX ORDER — CYCLOBENZAPRINE HCL 10 MG
10 TABLET ORAL 3 TIMES DAILY PRN
Qty: 12 TABLET | Refills: 0 | Status: SHIPPED | OUTPATIENT
Start: 2022-07-14 | End: 2022-07-18

## 2022-07-14 RX ADMIN — ACETAMINOPHEN 650 MG: 325 TABLET ORAL at 22:14

## 2022-07-14 ASSESSMENT — ENCOUNTER SYMPTOMS
CHEST TIGHTNESS: 0
SORE THROAT: 0
NUMBNESS: 0
PALPITATIONS: 0
BACK PAIN: 1
CHOKING: 0
VOMITING: 0
SINUS PAIN: 0
DIARRHEA: 0
COUGH: 0
MYALGIAS: 1
HEADACHES: 0
SHORTNESS OF BREATH: 0
DYSURIA: 0
FREQUENCY: 1
FACIAL SWELLING: 0
ABDOMINAL PAIN: 0
WEAKNESS: 0
FEVER: 0
HEMATURIA: 0
DIZZINESS: 0
CHILLS: 0
FATIGUE: 0
NAUSEA: 0

## 2022-07-15 LAB
ATRIAL RATE - MUSE: 94 BPM
DIASTOLIC BLOOD PRESSURE - MUSE: NORMAL MMHG
INTERPRETATION ECG - MUSE: NORMAL
P AXIS - MUSE: 63 DEGREES
PR INTERVAL - MUSE: 188 MS
QRS DURATION - MUSE: 70 MS
QT - MUSE: 358 MS
QTC - MUSE: 447 MS
R AXIS - MUSE: 7 DEGREES
SYSTOLIC BLOOD PRESSURE - MUSE: NORMAL MMHG
T AXIS - MUSE: 62 DEGREES
VENTRICULAR RATE- MUSE: 94 BPM

## 2022-07-15 NOTE — DISCHARGE INSTRUCTIONS
You are seen here in the emergency department for evaluation of muscle cramps, bilateral leg pain, chest pain, and thoracic back pain.  Your work-up here is very unremarkable, your labs are within normal limits, no evidence of heart attack, or blood clot.  No evidence of infection.  Your abdominal labs look normal, I do not think we need to further imaging there.  Your urine also does not show any sign of infection.  He felt some improvement after the Tylenol, encourage you to use that at home.  Additionally we will give you some muscle relaxants, you can use these at night for some further relief, do not drive with these as they can make it, sleepy.  Return to your primary care doctor if you continue to have persistent symptoms.  Return to the emergency department if you develop any worsening chest pain, shortness of breath, difficulty breathing.    For pain or fever you may use:  -Tylenol 650 mg every 6 hours.  Max 4000 mg in 24 hours  Do not use thismedication with alcohol as it can cause liver problems.  -Ibuprofen 600 mg every 6 hours.  Max 3500 mg in 24 hours  Do not take this medication if you have a history of a GI bleed or have kidney problems.  You may use both of these medications at the same time or you can alternate them every 3 hours.  For example, Tylenol at 6 AM, ibuprofen at 9 AM, Tylenol at noon, etc.

## 2022-07-15 NOTE — ED TRIAGE NOTES
Pt states she has been having pain in her medial lower legs that radiate up her legs to her groin. She also states that she has been having pain in her lower back for the last 3-4 days that radiates up the left side of her back. She states her arms and legs are cramping during the night. No swelling present at this time. She is able to ambulate.      Triage Assessment     Row Name 07/14/22 2036       Triage Assessment (Adult)    Airway WDL WDL       Respiratory WDL    Respiratory WDL WDL       Skin Circulation/Temperature WDL    Skin Circulation/Temperature WDL WDL       Cardiac WDL    Cardiac WDL WDL       Peripheral/Neurovascular WDL    Peripheral Neurovascular WDL WDL       Cognitive/Neuro/Behavioral WDL    Cognitive/Neuro/Behavioral WDL WDL       Luzmaria Coma Scale    Best Eye Response 4-->(E4) spontaneous    Best Motor Response 6-->(M6) obeys commands    Best Verbal Response 5-->(V5) oriented    Casmalia Coma Scale Score 15

## 2022-07-15 NOTE — ED PROVIDER NOTES
EMERGENCY DEPARTMENT ENCOUNTER      NAME: Elidia Ventura  AGE: 61 year old female  YOB: 1960  MRN: [id number]  EVALUATION DATE & TIME: No admission date for patient encounter.    PCP: Alesia Washington    ED PROVIDER: Alpesh George PA-C    Chief Complaint   Patient presents with     Back Pain     Leg Pain     FINAL IMPRESSION:  1. Acute bilateral thoracic back pain    2. Chest pain, unspecified type      ED COURSE & MEDICAL DECISION MAKING:    Pertinent Labs & Imaging studies reviewed. (See chart for details)  9:11 PM I met the patient and performed my initial interview and exam.  11:42 PM Discussed laboratory findings, and plan for discharge.     61 year old female presents to the Emergency Department for evaluation of bilateral lower back pain, leg pain, left lower chest pain.     ED Course as of 07/15/22 0023   Thu Jul 14, 2022   2241 Patient is a 61-year-old female with past medical history of atypical lymphocytosis, A. fib, who presents emergency department for evaluation of diffuse low back pain, as well as some LUQ abdominal pain.  Additionally she describes some back pain, does not radiate down her leg, does not seem consistent with sciatica.  Additionally notes some dysuria, urinary frequency.  Is been ongoing for the last 3 to 4 days.  She is concerned primarily for blood clots.  At this point her exam is relatively normal, however the differential is quite broad as she has multiple complaints including left upper quadrant abdominal pain, as well as chest wall pain, left-sided chest pain.  She denies any shortness of breath, making pulmonary embolism or ACS somewhat unlikely.  She is not tachypneic or dyspneic.  Initial laboratory studies will include troponin, lipase, D-dimer, CBC, CMP, UA.  Additionally we will get a chest x-ray, and EKG.  Patient was given some Tylenol.  Differential includes atypical ACS, blood clot, intra-abdominal infection such as appendicitis,  gastritis, GERD, acute cystitis,less likely to be pyelonephritis given her exam.   2243 Initial laboratory studies here are relatively unremarkable, she does have a leukocytosis, however has history of atypical lymphocytosis, and this is likely a byproduct of that.  Lipase negative, D-dimer negative, troponin negative, UA unremarkable.  Chest x-ray normal.   Fri Jul 15, 2022   0021 Discussed laboratory results with the patient, no abnormalities seen here.  Her CBC does show some leukocytosis, however she has been following with oncology for this, and I suspect her elevation white blood cell count today is due to this given that she does not have any other infectious symptoms.  Patient states her symptoms are improved after receiving some Tylenol.  We will trial some Flexeril at home for her at night to see if this helps with some of the cramping in her arms and legs.  No evidence of blood clot.  Do not think this is an example of atypical ACS given that she has had this pain for 3 to 4 days, no elevation of troponin now.  Negative urinalysis.  The remainder of her abdominal labs are unremarkable, I do not think she warrants further imaging at this time given that she has very mild symptoms.  Recommend that she follows up with her primary care doctor, she expressed understanding of this plan.  At this point chest pain unspecified most likely diagnosis, not cardiac in etiology.  Additionally some acute on chronic low back pain, thoracic back pain.  Plan for discharge at this time, primary care.        At the conclusion of the encounter I discussed the results of all of the tests and the disposition. The questions were answered. The patient or family acknowledged understanding and was agreeable with the care plan.     0 minutes of critical care time     MEDICATIONS GIVEN IN THE EMERGENCY:  Medications   acetaminophen (TYLENOL) tablet 650 mg (650 mg Oral Given 7/14/22 2214)       NEW PRESCRIPTIONS STARTED AT TODAY'S ER  VISIT  Discharge Medication List as of 7/14/2022 11:34 PM        =================================================================    HPI    Patient information was obtained from: Patient     Use of : N/A       Elidia Ventura is a 61 year old female with a pertinent history of neutropenia, atypical lymphoproliferative disorder, sciatica, who presents to this ED for evaluation of bilateral lower back pain, left-sided lower chest pain, bilateral leg pain.  She notes that this been ongoing for last 3 or 4 days.  She notes that her legs cramp up at night, does not notice any swelling.  She notes that the pain in her back is been persistent for 3 to 4 days, and radiates up from her left anterior chest to the left back around the thoracic area.  She denies any previous history of blood clots.  She denies any history of other chronic medical problems other than history of A. fib, does not take any blood thinners.  No history of blood clots in the past.  No fevers.  No difficulty breathing, no chest pain over her heart.  She denies any numbness or tingling in her hands or feet.  No swelling.  Denies any belly pain.  No changes in bowel.  Does note some urinary frequency, no dysuria or hematuria.  Recently was seen by primary care, has an MRI for some unclear dizziness.  Unable to view these records.    REVIEW OF SYSTEMS   Review of Systems   Constitutional: Negative for chills, fatigue and fever.   HENT: Negative for facial swelling, sinus pain and sore throat.    Respiratory: Negative for cough, choking, chest tightness and shortness of breath.    Cardiovascular: Positive for chest pain. Negative for palpitations and leg swelling.   Gastrointestinal: Negative for abdominal pain, diarrhea, nausea and vomiting.   Genitourinary: Positive for frequency and urgency. Negative for dysuria and hematuria.   Musculoskeletal: Positive for back pain and myalgias.   Neurological: Negative for dizziness, weakness, numbness  "and headaches.        PAST MEDICAL HISTORY:  No past medical history on file.    PAST SURGICAL HISTORY:  Past Surgical History:   Procedure Laterality Date     ARTHROTOMY KNEE       CHOLECYSTECTOMY       HYSTERECTOMY       NEPHRECTOMY       SPINAL FUSION         CURRENT MEDICATIONS:    cyclobenzaprine (FLEXERIL) 10 MG tablet  aspirin 81 MG EC tablet  blood glucose (ONETOUCH ULTRA) test strip  dicyclomine (BENTYL) 20 MG tablet  glipiZIDE (GLUCOTROL XL) 5 MG 24 hr tablet  metFORMIN (GLUCOPHAGE-XR) 500 MG 24 hr tablet  metoprolol tartrate (LOPRESSOR) 25 MG tablet  ondansetron (ZOFRAN ODT) 4 MG ODT tab  ONETOUCH ULTRA test strip  ranitidine (ZANTAC) 75 MG tablet       ALLERGIES:  Allergies   Allergen Reactions     Penicillins Anaphylaxis, Hives and Swelling     Other reaction(s): anaphylaxis, Angioedema     Butorphanol      Other reaction(s): *Unknown     Codeine Unknown and Other (See Comments)     Low blood pressure     Hydrocodone Unknown     Hydrocodone-Acetaminophen      Other reaction(s): *Unknown     Hydromorphone Other (See Comments)     Felt like her blood pressure dropped. Nausea.     Sulfamethoxazole      Other reaction(s): *Unknown       FAMILY HISTORY:  No family history on file.    SOCIAL HISTORY:   Social History     Socioeconomic History     Marital status:    Tobacco Use     Smoking status: Current Every Day Smoker     Packs/day: 0.50     Years: 25.00     Pack years: 12.50     Types: Cigarettes, Cigarettes     Smokeless tobacco: Never Used     Tobacco comment: 8 cigs per day   Substance and Sexual Activity     Alcohol use: No     Drug use: No     Sexual activity: Never       VITALS:  /55   Pulse 78   Temp 97.2  F (36.2  C) (Temporal)   Resp 16   Ht 1.651 m (5' 5\")   Wt 104.3 kg (230 lb)   SpO2 96%   BMI 38.27 kg/m      PHYSICAL EXAM    Physical Exam  Vitals and nursing note reviewed.   Constitutional:       General: She is not in acute distress.     Appearance: Normal appearance. " She is normal weight. She is not toxic-appearing or diaphoretic.   HENT:      Right Ear: External ear normal.      Left Ear: External ear normal.   Eyes:      Conjunctiva/sclera: Conjunctivae normal.   Cardiovascular:      Rate and Rhythm: Normal rate and regular rhythm.      Pulses: Normal pulses.   Pulmonary:      Effort: Pulmonary effort is normal. No respiratory distress.      Breath sounds: No stridor. No wheezing or rales.   Chest:      Chest wall: Tenderness present. No mass or deformity.       Abdominal:      General: Abdomen is flat. There is no distension.      Palpations: Abdomen is soft.      Tenderness: There is no abdominal tenderness. There is no guarding.   Musculoskeletal:         General: No swelling.      Thoracic back: Tenderness present.        Back:       Right lower leg: No edema.      Left lower leg: No edema.   Neurological:      Mental Status: She is alert. Mental status is at baseline.      Sensory: No sensory deficit.      Motor: No weakness.        LAB:  All pertinent labs reviewed and interpreted.  Labs Ordered and Resulted from Time of ED Arrival to Time of ED Departure   COMPREHENSIVE METABOLIC PANEL - Abnormal       Result Value    Sodium 137      Potassium 4.5      Chloride 106      Carbon Dioxide (CO2) 22      Anion Gap 9      Urea Nitrogen 18      Creatinine 1.20 (*)     Calcium 9.7      Glucose 227 (*)     Alkaline Phosphatase 168 (*)     AST 20      ALT 24      Protein Total 8.6 (*)     Albumin 3.7      Bilirubin Total 0.3      GFR Estimate 51 (*)    ROUTINE UA WITH MICROSCOPIC REFLEX TO CULTURE - Abnormal    Color Urine Light Yellow      Appearance Urine Clear      Glucose Urine Negative      Bilirubin Urine Negative      Ketones Urine Negative      Specific Gravity Urine 1.019      Blood Urine Negative      pH Urine 5.5      Protein Albumin Urine Negative      Urobilinogen Urine <2.0      Nitrite Urine Negative      Leukocyte Esterase Urine Negative      Bacteria Urine Few (*)      RBC Urine 1      WBC Urine 1      Squamous Epithelials Urine 1     D DIMER QUANTITATIVE - Normal    D-Dimer Quantitative 0.49     TROPONIN I - Normal    Troponin I <0.01     LIPASE - Normal    Lipase 20         RADIOLOGY:  Reviewed all pertinent imaging. Please see official radiology report.  Chest XR,  PA & LAT   Final Result   IMPRESSION: Negative chest.          EKG:    Performed at: 2137    Impression: Sinus rhythm    Rate: 94  Rhythm: 188  Axis: No axis deviation  MA Interval: 70  QRS Interval: 70  QTc Interval: 447  ST Changes: No ST elevation or depression  Comparison: No previous for comparison    I have reviewed and interpreted the EKG(s) documented above along with Dr. Flowers, ED MD.    PROCEDURES:   None.     Alpesh George PA-C  Emergency Medicine  [facility] EMERGENCY DEPARTMENT  [address] [facility] [id number] [phone]  Dept: [phone]     Alpesh George PA-C  07/15/22 0024

## 2022-09-10 ENCOUNTER — HOSPITAL ENCOUNTER (EMERGENCY)
Facility: HOSPITAL | Age: 62
Discharge: HOME | End: 2022-09-10
Attending: EMERGENCY MEDICINE | Admitting: EMERGENCY MEDICINE
Payer: MEDICARE

## 2022-09-10 ENCOUNTER — APPOINTMENT (OUTPATIENT)
Dept: CT IMAGING | Facility: HOSPITAL | Age: 62
End: 2022-09-10
Attending: EMERGENCY MEDICINE
Payer: MEDICARE

## 2022-09-10 ENCOUNTER — APPOINTMENT (OUTPATIENT)
Dept: RADIOLOGY | Facility: HOSPITAL | Age: 62
End: 2022-09-10
Attending: EMERGENCY MEDICINE
Payer: MEDICARE

## 2022-09-10 VITALS
SYSTOLIC BLOOD PRESSURE: 148 MMHG | OXYGEN SATURATION: 96 % | WEIGHT: 231.48 LBS | BODY MASS INDEX: 38.52 KG/M2 | RESPIRATION RATE: 16 BRPM | TEMPERATURE: 99.6 F | HEART RATE: 117 BPM | DIASTOLIC BLOOD PRESSURE: 66 MMHG

## 2022-09-10 DIAGNOSIS — K04.7 DENTAL INFECTION: ICD-10-CM

## 2022-09-10 LAB
ANION GAP SERPL CALCULATED.3IONS-SCNC: 9 MMOL/L (ref 5–18)
BASOPHILS # BLD AUTO: 0.1 10E3/UL (ref 0–0.2)
BASOPHILS NFR BLD AUTO: 0 %
BUN SERPL-MCNC: 8 MG/DL (ref 8–22)
CALCIUM SERPL-MCNC: 9.6 MG/DL (ref 8.5–10.5)
CHLORIDE BLD-SCNC: 107 MMOL/L (ref 98–107)
CO2 SERPL-SCNC: 23 MMOL/L (ref 22–31)
CREAT SERPL-MCNC: 0.77 MG/DL (ref 0.6–1.1)
EOSINOPHIL # BLD AUTO: 0.3 10E3/UL (ref 0–0.7)
EOSINOPHIL NFR BLD AUTO: 2 %
ERYTHROCYTE [DISTWIDTH] IN BLOOD BY AUTOMATED COUNT: 15.4 % (ref 10–15)
GFR SERPL CREATININE-BSD FRML MDRD: 87 ML/MIN/1.73M2
GLUCOSE BLD-MCNC: 90 MG/DL (ref 70–125)
GLUCOSE BLDC GLUCOMTR-MCNC: 146 MG/DL (ref 70–99)
HCT VFR BLD AUTO: 43.5 % (ref 35–47)
HGB BLD-MCNC: 14 G/DL (ref 11.7–15.7)
IMM GRANULOCYTES # BLD: 0.1 10E3/UL
IMM GRANULOCYTES NFR BLD: 1 %
LYMPHOCYTES # BLD AUTO: 2.7 10E3/UL (ref 0.8–5.3)
LYMPHOCYTES NFR BLD AUTO: 18 %
MCH RBC QN AUTO: 27.5 PG (ref 26.5–33)
MCHC RBC AUTO-ENTMCNC: 32.2 G/DL (ref 31.5–36.5)
MCV RBC AUTO: 85 FL (ref 78–100)
MONOCYTES # BLD AUTO: 1.4 10E3/UL (ref 0–1.3)
MONOCYTES NFR BLD AUTO: 10 %
NEUTROPHILS # BLD AUTO: 10.3 10E3/UL (ref 1.6–8.3)
NEUTROPHILS NFR BLD AUTO: 69 %
NRBC # BLD AUTO: 0 10E3/UL
NRBC BLD AUTO-RTO: 0 /100
PLATELET # BLD AUTO: 430 10E3/UL (ref 150–450)
POTASSIUM BLD-SCNC: 4.1 MMOL/L (ref 3.5–5)
RBC # BLD AUTO: 5.1 10E6/UL (ref 3.8–5.2)
SODIUM SERPL-SCNC: 139 MMOL/L (ref 136–145)
WBC # BLD AUTO: 14.7 10E3/UL (ref 4–11)

## 2022-09-10 PROCEDURE — 71045 X-RAY EXAM CHEST 1 VIEW: CPT

## 2022-09-10 PROCEDURE — 82310 ASSAY OF CALCIUM: CPT | Performed by: EMERGENCY MEDICINE

## 2022-09-10 PROCEDURE — 94640 AIRWAY INHALATION TREATMENT: CPT

## 2022-09-10 PROCEDURE — 70491 CT SOFT TISSUE NECK W/DYE: CPT | Mod: MA

## 2022-09-10 PROCEDURE — 250N000009 HC RX 250: Performed by: EMERGENCY MEDICINE

## 2022-09-10 PROCEDURE — 250N000011 HC RX IP 250 OP 636: Performed by: EMERGENCY MEDICINE

## 2022-09-10 PROCEDURE — 36415 COLL VENOUS BLD VENIPUNCTURE: CPT | Performed by: EMERGENCY MEDICINE

## 2022-09-10 PROCEDURE — 99285 EMERGENCY DEPT VISIT HI MDM: CPT | Mod: 25

## 2022-09-10 PROCEDURE — 250N000013 HC RX MED GY IP 250 OP 250 PS 637: Performed by: EMERGENCY MEDICINE

## 2022-09-10 PROCEDURE — 85025 COMPLETE CBC W/AUTO DIFF WBC: CPT | Performed by: EMERGENCY MEDICINE

## 2022-09-10 RX ORDER — ALBUTEROL SULFATE 0.83 MG/ML
5 SOLUTION RESPIRATORY (INHALATION) ONCE
Status: COMPLETED | OUTPATIENT
Start: 2022-09-10 | End: 2022-09-10

## 2022-09-10 RX ORDER — CLINDAMYCIN HCL 300 MG
300 CAPSULE ORAL 3 TIMES DAILY
Qty: 21 CAPSULE | Refills: 0 | Status: SHIPPED | OUTPATIENT
Start: 2022-09-10 | End: 2022-09-17

## 2022-09-10 RX ORDER — IOPAMIDOL 755 MG/ML
100 INJECTION, SOLUTION INTRAVASCULAR ONCE
Status: COMPLETED | OUTPATIENT
Start: 2022-09-10 | End: 2022-09-10

## 2022-09-10 RX ORDER — ALBUTEROL SULFATE 0.83 MG/ML
2.5 SOLUTION RESPIRATORY (INHALATION) EVERY 6 HOURS PRN
Qty: 75 ML | Refills: 0 | Status: SHIPPED | OUTPATIENT
Start: 2022-09-10

## 2022-09-10 RX ORDER — CLINDAMYCIN HCL 150 MG
300 CAPSULE ORAL ONCE
Status: COMPLETED | OUTPATIENT
Start: 2022-09-10 | End: 2022-09-10

## 2022-09-10 RX ADMIN — CLINDAMYCIN HYDROCHLORIDE 300 MG: 150 CAPSULE ORAL at 20:05

## 2022-09-10 RX ADMIN — ALBUTEROL SULFATE 5 MG: 2.5 SOLUTION RESPIRATORY (INHALATION) at 18:26

## 2022-09-10 RX ADMIN — IOPAMIDOL 100 ML: 755 INJECTION, SOLUTION INTRAVENOUS at 18:51

## 2022-09-10 ASSESSMENT — ACTIVITIES OF DAILY LIVING (ADL): ADLS_ACUITY_SCORE: 35

## 2022-09-10 NOTE — ED PROVIDER NOTES
EMERGENCY DEPARTMENT NOTE     Name: Elidia Ventura    Age/Sex: 61 year old female   MRN: [id number]   Evaluation Date & Time:  No admission date for patient encounter.    PCP:    Alesia Washington   ED Provider: Kyle Veliz D.O.       CHIEF COMPLAINT    Fever (Dent/) and Dental Problem       DIAGNOSIS & DISPOSITION     1. Dental infection      DISPOSITION: Home    At the conclusion of the encounter I discussed the results of all of the tests and the disposition. The questions were answered. The patient or family acknowledged understanding and was agreeable with the care plan.    TOTAL CRITICAL CARE TIME (EXCLUDING PROCEDURES): Not applicable    PROCEDURES:   None    EMERGENCY DEPARTMENT COURSE/MEDICAL DECISION MAKING   4:37 PM I met with the patient to gather history and to perform my initial exam.  We discussed treatment options and the plan for care while in the Emergency Department.  7:56 PM We discussed plans for discharge including supportive cares, symptomatic treatment, outpatient follow up, and reasons to return to the emergency department.     Elidia Ventura is a 61 year old female with relevant past history of HTN and type II diabetes mellitus who presents to the emergency department for evaluation of dental pain.  Triage note reviewed:She has a tooth infection on her lower left side. She saw a dentis t yesterday and was started on antibiotics. She says she has started the antibiotics. She is concerned now because she has a fever.    Vital signs:BP (!) 148/66   Pulse 117   Temp 99.6  F (37.6  C) (Oral)   Resp 16   Wt 105 kg (231 lb 7.7 oz)   SpO2 96%   BMI 38.52 kg/m    Pertinent physical exam findings:  HEENT: Fractured tooth #3 and 18.  Patient has gingival swelling around 18 but no drainable abscess.  There is swelling along the left mandibular and possibly submandibular area.  Pulmonary: Bilateral expiratory wheezing  Diagnostic studies:  Imaging:  Soft tissue neck CT w contrast    Final Result   IMPRESSION:    1.  Periapical abscess involving the left mandibular ADA 18. No soft tissue abscess.   2.  Periapical abscess involving right maxillary ADA 3. No soft tissue abscess.         XR Chest Port 1 View   Final Result   IMPRESSION: Mild cardiomegaly. Normal bony vasculature. There is a new streaky right basilar infiltrate. No pleural effusions. Internal fixation cervical spine.         Lab:  Labs Ordered and Resulted from Time of ED Arrival to Time of ED Departure   CBC WITH PLATELETS AND DIFFERENTIAL - Abnormal       Result Value    WBC Count 14.7 (*)     RBC Count 5.10      Hemoglobin 14.0      Hematocrit 43.5      MCV 85      MCH 27.5      MCHC 32.2      RDW 15.4 (*)     Platelet Count 430      % Neutrophils 69      % Lymphocytes 18      % Monocytes 10      % Eosinophils 2      % Basophils 0      % Immature Granulocytes 1      NRBCs per 100 WBC 0      Absolute Neutrophils 10.3 (*)     Absolute Lymphocytes 2.7      Absolute Monocytes 1.4 (*)     Absolute Eosinophils 0.3      Absolute Basophils 0.1      Absolute Immature Granulocytes 0.1      Absolute NRBCs 0.0     BASIC METABOLIC PANEL - Normal    Sodium 139      Potassium 4.1      Chloride 107      Carbon Dioxide (CO2) 23      Anion Gap 9      Urea Nitrogen 8      Creatinine 0.77      Calcium 9.6      Glucose 90      GFR Estimate 87        Interventions: Oral clindamycin, albuterol nebulizer  Medical decision makin-year-old female with history of insulin-dependent diabetes presented with subjective fever, dental pain and facial swelling.  On exam she had several carious teeth.  The left lower mandible there is buccal cellulitis and possible fullness or swelling in the submandibular area.  CT of the soft tissue neck did not show any deep space neck infection.  She has periapical abscess and tooth the left mandible and also the right upper maxillary tooth.  Patient has been on antibiotic prescribed as azithromycin with history of  penicillin allergy and will change antibiotic to clindamycin.  Patient will use probiotic and yogurt to try and prevent diarrhea.  She has scheduled follow-up with dentist early next week and will keep this appointment.  Patient on initial exam was noted to have bilateral expiratory wheezing.  She denied shortness of breath or chest pain.  With history of fever chest x-ray was checked and did not show evidence of pneumonia.  She was given albuterol nebulizer and on reexam wheezing has resolved.  Patient will be renewed on albuterol nebulizer solution to as needed and also follow-up with her primary care physician this week.    ED INTERVENTIONS     Medications   clindamycin (CLEOCIN) capsule 300 mg (has no administration in time range)   albuterol (PROVENTIL) neb solution 5 mg (5 mg Nebulization Given 9/10/22 1826)   iopamidol (ISOVUE-370) solution 100 mL (100 mLs Intravenous Given 9/10/22 1851)       DISCHARGE MEDICATIONS        Review of your medicines      UNREVIEWED medicines. Ask your doctor about these medicines      Dose / Directions   aspirin 81 MG EC tablet  Commonly known as: ASA      Dose: 81 mg  [ASPIRIN 81 MG EC TABLET] Take 81 mg by mouth daily.  Refills: 2     dicyclomine 20 MG tablet  Commonly known as: BENTYL      Dose: 20 mg  Take 1 tablet (20 mg) by mouth 4 times daily as needed (abdominal pain)  Quantity: 20 tablet  Refills: 0     glipiZIDE 5 MG 24 hr tablet  Commonly known as: GLUCOTROL XL      Dose: 5 mg  Take 5 mg by mouth daily  Refills: 0     metFORMIN 500 MG 24 hr tablet  Commonly known as: GLUCOPHAGE XR      Dose: 500 mg  [METFORMIN (GLUCOPHAGE-XR) 500 MG 24 HR TABLET] Take 500 mg by mouth 2 (two) times a day.  Refills: 0     metoprolol tartrate 25 MG tablet  Commonly known as: LOPRESSOR      Dose: 25 mg  [METOPROLOL TARTRATE (LOPRESSOR) 25 MG TABLET] Take 25 mg by mouth 2 (two) times a day.  Refills: 0     ondansetron 4 MG ODT tab  Commonly known as: Zofran ODT      Dose: 4 mg  Take 1  tablet (4 mg) by mouth every 8 hours as needed for nausea  Quantity: 30 tablet  Refills: 0     ranitidine 75 MG tablet  Commonly known as: ZANTAC      [RANITIDINE (ZANTAC) 75 MG TABLET] 1 tablet twice a day PRN  Refills: 0        START taking      Dose / Directions   clindamycin 300 MG capsule  Commonly known as: CLEOCIN      Dose: 300 mg  Take 1 capsule (300 mg) by mouth 3 times daily for 7 days  Quantity: 21 capsule  Refills: 0        CONTINUE these medicines which have NOT CHANGED      Dose / Directions   * OneTouch Ultra test strip  Generic drug: blood glucose      USE AS DIRECTED TO TEST TWICE DAILY FOR 90 DAYS  Refills: 0     * OneTouch Ultra test strip  Generic drug: blood glucose      USE AS DIRECTED TO TEST TWICE DAILY FOR 90 DAYS  Refills: 0         * This list has 2 medication(s) that are the same as other medications prescribed for you. Read the directions carefully, and ask your doctor or other care provider to review them with you.               Where to get your medicines      Some of these will need a paper prescription and others can be bought over the counter. Ask your nurse if you have questions.    Bring a paper prescription for each of these medications    clindamycin 300 MG capsule           INFORMATION SOURCE AND LIMITATIONS    History/Exam limitations: none  Patient information was obtained from: patient  Use of : N/A    HISTORY OF PRESENT ILLNESS   Elidia Ventura is a 61 year old year old female with a relevant past history of HTN and type II diabetes mellitus, who presents to this ED via walk in for evaluation of a dental pain.    On 9/6, the patient developed a abscess on the left lower gum line. Since onset, the abscess has fluctuated in size and has been more painful. Patient was seen on 9/9 by a dentist and they performed an X-ray which showed an infection. She was prescribed antibiotics with plans to pull the tooth once the infection decreases. Today, she presents with a  fever, chills and dizziness. Patient notes a foul odor from the abscess and minimal appetite with her last meal being yesterday. She reports having abdominal cramps and being gassy due to the antibiotics. She also felt nauseated while sleeping last night. Patient endorses increased congestion in her chest, chest pain, and bilateral feet swelling. Reports frequent episodes of pneumonia. Denies dysuria, hematuria, trouble urinating, blood in stool and any other complaints or concerns.     Of note, Her BS have been in the upper 200's. No history of asthma but has used inhalers in the past. Patient is a smoker. Denies alcohol use.       REVIEW OF SYSTEMS:   Constitutional: Positive for fever, minimal appetite and chills  HENT: Negative for URI symptoms or sore throat. Positive for left sided dental pain, foul odor and abscess.  Cardiac: Negative for palpitations, near syncope or syncope Positive for chest pain, congestion, and bilateral feet swelling.  Respiratory: Negative for cough and shortness of breath.    Gastrointestinal: Negative for abdominal pain, vomiting, constipation, diarrhea, rectal bleeding or melena. Positive for nausea and abdominal cramps.  Genitourinary: Negative for dysuria, flank pain, difficulty urinating and hematuria.   Musculoskeletal: Negative for back pain.   Skin: Negative for rash  Neurological: Negative for headache, syncope, speech difficulty, unilateral weakness or imbalance with walking. Positive for dizziness.   Hematological: Negative for adenopathy. Does not bruise/bleed easily.   Psychiatric/Behavioral: Negative for confusion.       PATIENT HISTORY   History reviewed. No pertinent past medical history.  Patient Active Problem List   Diagnosis     Atypical lymphoproliferative disorder (H)     Asthma, mild intermittent     Controlled type 2 diabetes mellitus without complication, without long-term current use of insulin (H)     Fusion of spine, cervical region     History of kidney  surgery     HTN (hypertension)     Hyperglycemia due to type 2 diabetes mellitus (H)     Knee meniscus pain, left     Neutropenia (H)     Pain     Reflux gastritis     Sciatica     Seasonal allergies     SOB (shortness of breath)     Steatosis of liver     Tachycardia     Urinary incontinence     Weakness generalized     Past Surgical History:   Procedure Laterality Date     ARTHROTOMY KNEE       CHOLECYSTECTOMY       HYSTERECTOMY       NEPHRECTOMY       SPINAL FUSION       Social Histrory  Smoking:  Alcohol Use:  Allergies   Allergen Reactions     Penicillins Anaphylaxis, Hives and Swelling     Other reaction(s): anaphylaxis, Angioedema     Butorphanol      Other reaction(s): *Unknown     Codeine Unknown and Other (See Comments)     Low blood pressure     Hydrocodone Unknown     Hydrocodone-Acetaminophen      Other reaction(s): *Unknown     Hydromorphone Other (See Comments)     Felt like her blood pressure dropped. Nausea.     Sulfamethoxazole      Other reaction(s): *Unknown         OUTPATIENT MEDICATIONS     New Prescriptions    CLINDAMYCIN (CLEOCIN) 300 MG CAPSULE    Take 1 capsule (300 mg) by mouth 3 times daily for 7 days      Vitals:    09/10/22 1534 09/10/22 1815 09/10/22 1830   BP: 128/74 136/63 133/60   Pulse: 105 94 97   Resp: 16     Temp: 99.6  F (37.6  C)     TempSrc: Oral     SpO2: 95% 95% 97%   Weight: 105 kg (231 lb 7.7 oz)         Physical Exam   Constitutional: Oriented to person, place, and time. Appears well-developed and well-nourished.   HEENT:    Head: Atraumatic.   Neck: Normal range of motion. Neck supple.   Cardiovascular: Normal rate, regular rhythm and normal heart sounds.    Pulmonary/Chest: Normal effort  and breath sounds normal.   Abdominal: Soft. Bowel sounds are normal.   Musculoskeletal: Normal range of motion.   Neurological: Alert and oriented to person, place, and time. Normal strength.No sensory deficit. No cranial nerve deficit . Skin: Skin is warm and dry.   Psychiatric:  Normal mood and affect. Behavior is normal. Thought content normal.       DIAGNOSTICS    LABORATORY FINDINGS (REVIEWED AND INTERPRETED):  Labs Ordered and Resulted from Time of ED Arrival to Time of ED Departure   CBC WITH PLATELETS AND DIFFERENTIAL - Abnormal       Result Value    WBC Count 14.7 (*)     RBC Count 5.10      Hemoglobin 14.0      Hematocrit 43.5      MCV 85      MCH 27.5      MCHC 32.2      RDW 15.4 (*)     Platelet Count 430      % Neutrophils 69      % Lymphocytes 18      % Monocytes 10      % Eosinophils 2      % Basophils 0      % Immature Granulocytes 1      NRBCs per 100 WBC 0      Absolute Neutrophils 10.3 (*)     Absolute Lymphocytes 2.7      Absolute Monocytes 1.4 (*)     Absolute Eosinophils 0.3      Absolute Basophils 0.1      Absolute Immature Granulocytes 0.1      Absolute NRBCs 0.0     BASIC METABOLIC PANEL - Normal    Sodium 139      Potassium 4.1      Chloride 107      Carbon Dioxide (CO2) 23      Anion Gap 9      Urea Nitrogen 8      Creatinine 0.77      Calcium 9.6      Glucose 90      GFR Estimate 87           IMAGING (REVIEWED AND INTERPRETED):  Soft tissue neck CT w contrast   Final Result   IMPRESSION:    1.  Periapical abscess involving the left mandibular ADA 18. No soft tissue abscess.   2.  Periapical abscess involving right maxillary ADA 3. No soft tissue abscess.         XR Chest Port 1 View   Final Result   IMPRESSION: Mild cardiomegaly. Normal bony vasculature. There is a new streaky right basilar infiltrate. No pleural effusions. Internal fixation cervical spine.            I, Any Casey, am serving as a scribe to document services personally performed by Kyle Veliz D.O., based on my observation and the provider s statements to me.    I, Kyle Veliz D.O., attest that Any Casey is acting in a scribe capacity, has observed my performance of the services and has documented them in accordance with my direction.    Kyle Veliz D.O.  EMERGENCY MEDICINE    09/10/22  [facility] EMERGENCY DEPARTMENT  [facility] [facility] [address]  [phone]  Dept: [phone]       Kyle Veliz DO  09/11/22 0107

## 2022-09-10 NOTE — ED TRIAGE NOTES
She has a tooth infection on her lower left side. She saw a dentis t yesterday and was started on antibiotics. She says she has started the antibiotics. She is concerned now because she has a fever.

## 2022-09-11 NOTE — DISCHARGE INSTRUCTIONS
Discontinue previously prescribed antibiotic and start clindamycin.  Follow-up with your dentist next week.  Take ibuprofen 600 mg every 8 hours and Tylenol 1 g every 8 hours for pain management.  Eat yogurt or obtain probiotic to prevent diarrhea.  Return to the emergency department if uncontrolled pain, facial swelling or fever

## 2022-09-11 NOTE — ED NOTES
Writer updated Dr. Veliz that the patient's heart rate was elevated in the 120's low 130's for a period of time.  Per Dr. Veliz, patient ok to discharge.

## 2022-11-14 ENCOUNTER — APPOINTMENT (OUTPATIENT)
Dept: CT IMAGING | Facility: HOSPITAL | Age: 62
End: 2022-11-14
Attending: FAMILY MEDICINE
Payer: MEDICARE

## 2022-11-14 ENCOUNTER — HOSPITAL ENCOUNTER (EMERGENCY)
Facility: HOSPITAL | Age: 62
Discharge: HOME | End: 2022-11-14
Attending: FAMILY MEDICINE | Admitting: FAMILY MEDICINE
Payer: MEDICARE

## 2022-11-14 VITALS
OXYGEN SATURATION: 98 % | TEMPERATURE: 98.6 F | RESPIRATION RATE: 26 BRPM | SYSTOLIC BLOOD PRESSURE: 122 MMHG | DIASTOLIC BLOOD PRESSURE: 60 MMHG | HEART RATE: 98 BPM

## 2022-11-14 DIAGNOSIS — R10.31 RLQ ABDOMINAL PAIN: ICD-10-CM

## 2022-11-14 DIAGNOSIS — D72.829 LEUKOCYTOSIS, UNSPECIFIED TYPE: ICD-10-CM

## 2022-11-14 DIAGNOSIS — D47.9 ATYPICAL LYMPHOPROLIFERATIVE DISORDER (H): ICD-10-CM

## 2022-11-14 LAB
ALBUMIN SERPL BCG-MCNC: 4.1 G/DL (ref 3.5–5.2)
ALBUMIN UR-MCNC: 10 MG/DL
ALP SERPL-CCNC: 176 U/L (ref 35–104)
ALT SERPL W P-5'-P-CCNC: 23 U/L (ref 10–35)
ANION GAP SERPL CALCULATED.3IONS-SCNC: 9 MMOL/L (ref 7–15)
APPEARANCE UR: ABNORMAL
AST SERPL W P-5'-P-CCNC: 19 U/L (ref 10–35)
BACTERIA #/AREA URNS HPF: ABNORMAL /HPF
BASOPHILS # BLD MANUAL: 0 10E3/UL (ref 0–0.2)
BASOPHILS NFR BLD MANUAL: 0 %
BILIRUB DIRECT SERPL-MCNC: <0.2 MG/DL (ref 0–0.3)
BILIRUB SERPL-MCNC: 0.4 MG/DL
BILIRUB UR QL STRIP: NEGATIVE
BUN SERPL-MCNC: 10.2 MG/DL (ref 8–23)
CALCIUM SERPL-MCNC: 9.2 MG/DL (ref 8.8–10.2)
CHLORIDE SERPL-SCNC: 104 MMOL/L (ref 98–107)
COLOR UR AUTO: ABNORMAL
CREAT SERPL-MCNC: 0.76 MG/DL (ref 0.51–0.95)
DEPRECATED HCO3 PLAS-SCNC: 23 MMOL/L (ref 22–29)
EOSINOPHIL # BLD MANUAL: 0 10E3/UL (ref 0–0.7)
EOSINOPHIL NFR BLD MANUAL: 0 %
ERYTHROCYTE [DISTWIDTH] IN BLOOD BY AUTOMATED COUNT: 14.6 % (ref 10–15)
GFR SERPL CREATININE-BSD FRML MDRD: 88 ML/MIN/1.73M2
GLUCOSE SERPL-MCNC: 159 MG/DL (ref 70–99)
GLUCOSE UR STRIP-MCNC: NEGATIVE MG/DL
HCT VFR BLD AUTO: 44 % (ref 35–47)
HGB BLD-MCNC: 14.2 G/DL (ref 11.7–15.7)
HGB UR QL STRIP: NEGATIVE
KETONES UR STRIP-MCNC: NEGATIVE MG/DL
LEUKOCYTE ESTERASE UR QL STRIP: NEGATIVE
LIPASE SERPL-CCNC: 18 U/L (ref 13–60)
LYMPHOCYTES # BLD MANUAL: 3.2 10E3/UL (ref 0.8–5.3)
LYMPHOCYTES NFR BLD MANUAL: 20 %
MAGNESIUM SERPL-MCNC: 1.9 MG/DL (ref 1.7–2.3)
MCH RBC QN AUTO: 27.6 PG (ref 26.5–33)
MCHC RBC AUTO-ENTMCNC: 32.3 G/DL (ref 31.5–36.5)
MCV RBC AUTO: 86 FL (ref 78–100)
MONOCYTES # BLD MANUAL: 1.6 10E3/UL (ref 0–1.3)
MONOCYTES NFR BLD MANUAL: 10 %
MUCOUS THREADS #/AREA URNS LPF: PRESENT /LPF
NEUTROPHILS # BLD MANUAL: 11.2 10E3/UL (ref 1.6–8.3)
NEUTROPHILS NFR BLD MANUAL: 70 %
NITRATE UR QL: NEGATIVE
PH UR STRIP: 5.5 [PH] (ref 5–7)
PLAT MORPH BLD: ABNORMAL
PLATELET # BLD AUTO: 465 10E3/UL (ref 150–450)
POTASSIUM SERPL-SCNC: 4 MMOL/L (ref 3.4–5.3)
PROT SERPL-MCNC: 7.7 G/DL (ref 6.4–8.3)
RBC # BLD AUTO: 5.14 10E6/UL (ref 3.8–5.2)
RBC MORPH BLD: ABNORMAL
RBC URINE: 1 /HPF
SODIUM SERPL-SCNC: 136 MMOL/L (ref 136–145)
SP GR UR STRIP: 1.02 (ref 1–1.03)
SQUAMOUS EPITHELIAL: 4 /HPF
UROBILINOGEN UR STRIP-MCNC: <2 MG/DL
WBC # BLD AUTO: 16 10E3/UL (ref 4–11)
WBC URINE: 1 /HPF

## 2022-11-14 PROCEDURE — 85007 BL SMEAR W/DIFF WBC COUNT: CPT | Performed by: FAMILY MEDICINE

## 2022-11-14 PROCEDURE — 85027 COMPLETE CBC AUTOMATED: CPT | Performed by: FAMILY MEDICINE

## 2022-11-14 PROCEDURE — 250N000011 HC RX IP 250 OP 636: Performed by: FAMILY MEDICINE

## 2022-11-14 PROCEDURE — 36415 COLL VENOUS BLD VENIPUNCTURE: CPT | Performed by: FAMILY MEDICINE

## 2022-11-14 PROCEDURE — 74177 CT ABD & PELVIS W/CONTRAST: CPT | Mod: MA

## 2022-11-14 PROCEDURE — 96374 THER/PROPH/DIAG INJ IV PUSH: CPT | Mod: 59

## 2022-11-14 PROCEDURE — 82248 BILIRUBIN DIRECT: CPT | Performed by: FAMILY MEDICINE

## 2022-11-14 PROCEDURE — 81001 URINALYSIS AUTO W/SCOPE: CPT | Performed by: FAMILY MEDICINE

## 2022-11-14 PROCEDURE — 99285 EMERGENCY DEPT VISIT HI MDM: CPT | Mod: 25

## 2022-11-14 PROCEDURE — 83735 ASSAY OF MAGNESIUM: CPT | Performed by: FAMILY MEDICINE

## 2022-11-14 PROCEDURE — 83690 ASSAY OF LIPASE: CPT | Performed by: FAMILY MEDICINE

## 2022-11-14 RX ORDER — IOPAMIDOL 755 MG/ML
100 INJECTION, SOLUTION INTRAVASCULAR ONCE
Status: COMPLETED | OUTPATIENT
Start: 2022-11-14 | End: 2022-11-14

## 2022-11-14 RX ORDER — KETOROLAC TROMETHAMINE 15 MG/ML
15 INJECTION, SOLUTION INTRAMUSCULAR; INTRAVENOUS ONCE
Status: DISCONTINUED | OUTPATIENT
Start: 2022-11-14 | End: 2022-11-14 | Stop reason: HOSPADM

## 2022-11-14 RX ORDER — ONDANSETRON 2 MG/ML
4 INJECTION INTRAMUSCULAR; INTRAVENOUS ONCE
Status: COMPLETED | OUTPATIENT
Start: 2022-11-14 | End: 2022-11-14

## 2022-11-14 RX ORDER — ONDANSETRON 4 MG/1
4 TABLET, ORALLY DISINTEGRATING ORAL EVERY 6 HOURS PRN
Qty: 20 TABLET | Refills: 0 | Status: SHIPPED | OUTPATIENT
Start: 2022-11-14 | End: 2022-11-17

## 2022-11-14 RX ORDER — DICYCLOMINE HCL 20 MG
20 TABLET ORAL 4 TIMES DAILY PRN
Qty: 10 TABLET | Refills: 0 | Status: SHIPPED | OUTPATIENT
Start: 2022-11-14 | End: 2022-11-24

## 2022-11-14 RX ADMIN — IOPAMIDOL 100 ML: 755 INJECTION, SOLUTION INTRAVENOUS at 01:06

## 2022-11-14 RX ADMIN — ONDANSETRON 4 MG: 2 INJECTION INTRAMUSCULAR; INTRAVENOUS at 00:25

## 2022-11-14 ASSESSMENT — ENCOUNTER SYMPTOMS
CONSTIPATION: 1
ABDOMINAL PAIN: 1
VOMITING: 0
HEMATURIA: 0
DYSURIA: 0
NAUSEA: 1
FREQUENCY: 0
DIFFICULTY URINATING: 0
BLOOD IN STOOL: 1
DIARRHEA: 0
BACK PAIN: 1

## 2022-11-14 ASSESSMENT — ACTIVITIES OF DAILY LIVING (ADL): ADLS_ACUITY_SCORE: 35

## 2022-11-14 NOTE — ED NOTES
Bed: JNED-18  Expected date: 11/14/22  Expected time: 12:02 AM  Means of arrival: Ambulance  Comments:  Mplwd  63 yo abd pain/nausea

## 2022-11-14 NOTE — ED PROVIDER NOTES
EMERGENCY DEPARTMENT ENCOUNTER      NAME: Elidia Ventura  AGE: 62 year old female  YOB: 1960  MRN: [id number]  EVALUATION DATE & TIME: 11/14/2022 12:08 AM    PCP: Alesia Washington    ED PROVIDER: Tera Flowers M.D.    Chief Complaint   Patient presents with     Abdominal Pain       FINAL IMPRESSION:  No diagnosis found.    ED COURSE & MEDICAL DECISION MAKING:    Pertinent Labs & Imaging studies personally reviewed and interpreted by me. (See chart for details)    12:10 AM Patient seen and examined, prior inpatient, outpatient, emergency department records reviewed.  Additional history from EMS.  Patient's care impacted diabetes differential diagnosis includes but not limited to gastritis, cholecystitis, pancreatitis, colitis, enteritis, diverticulitis, urinary tract infection, myocardial infarction, pneumonia appendicitis, AAA, cholelithiasis, ischemic bowel.  Patient presents with 3 days of right lower quadrant pain.  This occasionally radiates upward.  On exam, generally appears comfortable but does have right lower quadrant tenderness, afebrile.  Prior cholecystectomy and what sounds like a right partial nephrectomy.  Labs and CT scan are ordered.  12:54 AM white blood cell count is elevated, remaining labs are reassuring other than mildly elevated alkaline phosphatase, prior cholecystectomy.  CT scan is pending.  1:52 AM CT personally reviewed and interpreted by me and discussed with radiology, no definite acute intra-abdominal pathology seen.  Patient does have leukocytosis of undetermined etiology, otherwise labs are reassuring.  Patient is afebrile, borderline tachycardia improved with pain management.  Oxygen saturation noted to be in the low 90s when patient is sleeping.  Plan to discharge with medication for pain and nausea, follow-up with primary care.    At the conclusion of the encounter I discussed the results of all of the tests and the disposition. The questions were  answered. The patient or family acknowledged understanding and was agreeable with the care plan.     PROCEDURES:   Procedures    MEDICATIONS GIVEN IN THE EMERGENCY:  Medications   ketorolac (TORADOL) injection 15 mg (has no administration in time range)   ondansetron (ZOFRAN) injection 4 mg (4 mg Intravenous Given 11/14/22 0025)   iopamidol (ISOVUE-370) solution 100 mL (100 mLs Intravenous Given 11/14/22 0106)       NEW PRESCRIPTIONS STARTED AT TODAY'S ER VISIT  New Prescriptions    No medications on file       =================================================================    HPI    Patient information was obtained from: Patient       Elidia Ventura is a 62 year old female with a pertinent medical history of steatosis of liver, GERD, T2DM, hyperglycemia, neutropenia, atypical lymphoproliferative disorder, HTN, who presents to this ED via walk-in for evaluation of abdominal pain.    Patient reports having 3 days worth of right lower quadrant abdominal pain which she mentions is intermittent and waxing and waning in intensity. She states occasionally the pain radiates to her back. She endorses associated nausea, but denies any recent vomiting. She mentions that initially she had constipation associated with her symptoms, but she notes that this has since resolves, and following her constipation resolution she began to have more frequent bowel movements, approximately once every hour. She denies any recent diarrhea. She endorses having mild blood in her stool recently. She denies any recent urinary issues. She denies taking any medications for her symptoms, including Tylenol or Ibuprofen. She endorses having similar abdominal pain in the past, but notes previously it was at her right upper quadrant. She endorses a PMH of DM, but denies any known PMH of HTN. She endorses regularly taking Glipizide and Metoprolol. She denies remembering why she takes Metoprolol, but states that she knows it is related to her  heartbeat. She endorses smoking, but denies any alcohol use. She mentions that she is unemployed because she is disabled. She endorses a PMH of a cholecystectomy and nephrectomy where only a part of her right kidney was removed. 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 She denies any other complications at this time.     REVIEW OF SYSTEMS   Review of Systems   Gastrointestinal: Positive for abdominal pain (RLQ), blood in stool (mild), constipation (resolved) and nausea. Negative for diarrhea and vomiting.        Positive for bowel movement frequency.    Genitourinary: Negative for difficulty urinating, dysuria, frequency, hematuria and urgency.   Musculoskeletal: Positive for back pain (intermittent, secondary to abdominal pain).   All other systems reviewed and are negative.     All other systems reviewed and negative    PAST MEDICAL HISTORY:  History reviewed. No pertinent past medical history.    PAST SURGICAL HISTORY:  Past Surgical History:   Procedure Laterality Date     ARTHROTOMY KNEE       CHOLECYSTECTOMY       HYSTERECTOMY       NEPHRECTOMY       SPINAL FUSION         CURRENT MEDICATIONS:    Current Facility-Administered Medications   Medication     ketorolac (TORADOL) injection 15 mg     Current Outpatient Medications   Medication     albuterol (PROVENTIL) (2.5 MG/3ML) 0.083% neb solution     aspirin 81 MG EC tablet     blood glucose (ONETOUCH ULTRA) test strip     dicyclomine (BENTYL) 20 MG tablet     glipiZIDE (GLUCOTROL XL) 5 MG 24 hr tablet     metFORMIN (GLUCOPHAGE-XR) 500 MG 24 hr tablet     metoprolol tartrate (LOPRESSOR) 25 MG tablet     ondansetron (ZOFRAN ODT) 4 MG ODT tab     ONETOUCH ULTRA test strip     ranitidine (ZANTAC) 75 MG tablet       ALLERGIES:  Allergies   Allergen Reactions     Penicillins Anaphylaxis, Hives and Swelling     Other reaction(s): anaphylaxis, Angioedema     Butorphanol      Other reaction(s): *Unknown     Codeine  Unknown and Other (See Comments)     Low blood pressure     Hydrocodone Unknown     Hydrocodone-Acetaminophen      Other reaction(s): *Unknown     Hydromorphone Other (See Comments)     Felt like her blood pressure dropped. Nausea.     Sulfamethoxazole      Other reaction(s): *Unknown       FAMILY HISTORY:  No family history on file.    SOCIAL HISTORY:   Social History     Socioeconomic History     Marital status:    Tobacco Use     Smoking status: Every Day     Packs/day: 0.50     Years: 25.00     Pack years: 12.50     Types: Cigarettes     Smokeless tobacco: Never     Tobacco comments:     8 cigs per day   Substance and Sexual Activity     Alcohol use: No     Drug use: No     Sexual activity: Never       VITALS:  /56   Pulse 100   Temp 98.4  F (36.9  C) (Oral)   Resp 28   SpO2 97%     PHYSICAL EXAM:  Physical Exam  Vitals and nursing note reviewed.   Constitutional:       Appearance: Normal appearance.   HENT:      Head: Normocephalic and atraumatic.      Right Ear: External ear normal.      Left Ear: External ear normal.      Nose: Nose normal.      Mouth/Throat:      Mouth: Mucous membranes are moist.   Eyes:      Extraocular Movements: Extraocular movements intact.      Conjunctiva/sclera: Conjunctivae normal.      Pupils: Pupils are equal, round, and reactive to light.   Cardiovascular:      Rate and Rhythm: Normal rate and regular rhythm.   Pulmonary:      Effort: Pulmonary effort is normal.      Breath sounds: Normal breath sounds. No wheezing or rales.   Abdominal:      General: Abdomen is flat. There is no distension.      Palpations: Abdomen is soft.      Tenderness: There is abdominal tenderness in the right upper quadrant. There is no guarding.      Comments: Midline surgical scar present.    Musculoskeletal:         General: Normal range of motion.      Cervical back: Normal range of motion and neck supple.      Right lower leg: No edema.      Left lower leg: No edema.    Lymphadenopathy:      Cervical: No cervical adenopathy.   Skin:     General: Skin is warm and dry.   Neurological:      General: No focal deficit present.      Mental Status: She is alert and oriented to person, place, and time. Mental status is at baseline.      Comments: No gross focal neurologic deficits   Psychiatric:         Mood and Affect: Mood normal.         Behavior: Behavior normal.         Thought Content: Thought content normal.          LAB:  All pertinent labs reviewed and interpreted.  Results for orders placed or performed during the hospital encounter of 11/14/22   CT Abdomen Pelvis w Contrast    Impression    IMPRESSION:   1.  No cause of acute pain identified in the abdomen or pelvis. Normal appendix.  2.  Colonic diverticulosis without evidence of diverticulitis.    Basic metabolic panel   Result Value Ref Range    Sodium 136 136 - 145 mmol/L    Potassium 4.0 3.4 - 5.3 mmol/L    Chloride 104 98 - 107 mmol/L    Carbon Dioxide (CO2) 23 22 - 29 mmol/L    Anion Gap 9 7 - 15 mmol/L    Urea Nitrogen 10.2 8.0 - 23.0 mg/dL    Creatinine 0.76 0.51 - 0.95 mg/dL    Calcium 9.2 8.8 - 10.2 mg/dL    Glucose 159 (H) 70 - 99 mg/dL    GFR Estimate 88 >60 mL/min/1.73m2   Result Value Ref Range    Magnesium 1.9 1.7 - 2.3 mg/dL   Result Value Ref Range    Lipase 18 13 - 60 U/L   Hepatic function panel   Result Value Ref Range    Protein Total 7.7 6.4 - 8.3 g/dL    Albumin 4.1 3.5 - 5.2 g/dL    Bilirubin Total 0.4 <=1.2 mg/dL    Alkaline Phosphatase 176 (H) 35 - 104 U/L    AST 19 10 - 35 U/L    ALT 23 10 - 35 U/L    Bilirubin Direct <0.20 0.00 - 0.30 mg/dL   UA with Microscopic reflex to Culture    Specimen: Urine, Midstream   Result Value Ref Range    Color Urine Light Yellow Colorless, Straw, Light Yellow, Yellow    Appearance Urine Turbid (A) Clear    Glucose Urine Negative Negative mg/dL    Bilirubin Urine Negative Negative    Ketones Urine Negative Negative mg/dL    Specific Gravity Urine 1.022 1.001 - 1.030     Blood Urine Negative Negative    pH Urine 5.5 5.0 - 7.0    Protein Albumin Urine 10 (A) Negative mg/dL    Urobilinogen Urine <2.0 <2.0 mg/dL    Nitrite Urine Negative Negative    Leukocyte Esterase Urine Negative Negative    Bacteria Urine Moderate (A) None Seen /HPF    Mucus Urine Present (A) None Seen /LPF    RBC Urine 1 <=2 /HPF    WBC Urine 1 <=5 /HPF    Squamous Epithelials Urine 4 (H) <=1 /HPF   CBC with platelets and differential   Result Value Ref Range    WBC Count 16.0 (H) 4.0 - 11.0 10e3/uL    RBC Count 5.14 3.80 - 5.20 10e6/uL    Hemoglobin 14.2 11.7 - 15.7 g/dL    Hematocrit 44.0 35.0 - 47.0 %    MCV 86 78 - 100 fL    MCH 27.6 26.5 - 33.0 pg    MCHC 32.3 31.5 - 36.5 g/dL    RDW 14.6 10.0 - 15.0 %    Platelet Count 465 (H) 150 - 450 10e3/uL   Manual Differential   Result Value Ref Range    % Neutrophils 70 %    % Lymphocytes 20 %    % Monocytes 10 %    % Eosinophils 0 %    % Basophils 0 %    Absolute Neutrophils 11.2 (H) 1.6 - 8.3 10e3/uL    Absolute Lymphocytes 3.2 0.8 - 5.3 10e3/uL    Absolute Monocytes 1.6 (H) 0.0 - 1.3 10e3/uL    Absolute Eosinophils 0.0 0.0 - 0.7 10e3/uL    Absolute Basophils 0.0 0.0 - 0.2 10e3/uL    RBC Morphology Confirmed RBC Indices     Platelet Assessment  Automated Count Confirmed. Platelet morphology is normal.     Automated Count Confirmed. Platelet morphology is normal.       RADIOLOGY:  Reviewed all pertinent imaging. Please see official radiology report.  CT Abdomen Pelvis w Contrast   Final Result   IMPRESSION:    1.  No cause of acute pain identified in the abdomen or pelvis. Normal appendix.   2.  Colonic diverticulosis without evidence of diverticulitis.             I, Grey Restrepo, am serving as a scribe to document services personally performed by Dr. Flowers based on my observation and the provider's statements to me. I, Tera Flowers MD attest that Grey Restrepo is acting in a scribe capacity, has observed my performance of the services and has  documented them in accordance with my direction.    Tera Flowers M.D.  Emergency Medicine  [facility] EMERGENCY DEPARTMENT  [facility] [facility] [id number] [phone]  Dept: [phone]       Tera Flowers MD  11/14/22 0202

## 2022-11-14 NOTE — ED TRIAGE NOTES
Right upper and lower abdomen pain that started three days ago. Pain is intermittent with increased nausea.      Triage Assessment     Row Name 11/14/22 0012       Triage Assessment (Adult)    Airway WDL WDL       Respiratory WDL    Respiratory WDL WDL       Skin Circulation/Temperature WDL    Skin Circulation/Temperature WDL WDL       Cardiac WDL    Cardiac WDL WDL       Peripheral/Neurovascular WDL    Peripheral Neurovascular WDL WDL       Cognitive/Neuro/Behavioral WDL    Cognitive/Neuro/Behavioral WDL WDL

## 2023-05-18 ENCOUNTER — TELEPHONE (OUTPATIENT)
Dept: ONCOLOGY | Facility: HOSPITAL | Age: 63
End: 2023-05-18
Payer: MEDICARE

## 2023-06-05 ENCOUNTER — TELEPHONE (OUTPATIENT)
Dept: ONCOLOGY | Facility: HOSPITAL | Age: 63
End: 2023-06-05
Payer: MEDICARE

## 2023-06-05 NOTE — TELEPHONE ENCOUNTER
----- Message from Melissa Junior RN sent at 5/31/2023  9:47 AM CDT -----  Please call pt to schedule overdue recall.  Thanks, Melissa    ----- Message -----  From: SYSTEM  Sent: 5/31/2023  12:21 AM CDT  To: [facility] Support Pool

## 2023-07-26 ENCOUNTER — HOSPITAL ENCOUNTER (OUTPATIENT)
Dept: CT IMAGING | Facility: HOSPITAL | Age: 63
Discharge: HOME | End: 2023-07-26
Attending: NURSE PRACTITIONER | Admitting: NURSE PRACTITIONER
Payer: MEDICARE

## 2023-07-26 ENCOUNTER — LAB REQUISITION (OUTPATIENT)
Dept: LAB | Facility: CLINIC | Age: 63
End: 2023-07-26
Payer: COMMERCIAL

## 2023-07-26 ENCOUNTER — LAB REQUISITION (OUTPATIENT)
Dept: LAB | Facility: CLINIC | Age: 63
End: 2023-07-26
Payer: MEDICARE

## 2023-07-26 DIAGNOSIS — D72.829 ELEVATED WBCS: ICD-10-CM

## 2023-07-26 DIAGNOSIS — R10.31 RLQ ABDOMINAL PAIN: ICD-10-CM

## 2023-07-26 DIAGNOSIS — E11.65 TYPE 2 DIABETES MELLITUS WITH HYPERGLYCEMIA (H): ICD-10-CM

## 2023-07-26 DIAGNOSIS — R10.31 RIGHT LOWER QUADRANT PAIN: ICD-10-CM

## 2023-07-26 LAB
CREAT BLD-MCNC: 0.9 MG/DL (ref 0.6–1.1)
GFR SERPL CREATININE-BSD FRML MDRD: >60 ML/MIN/1.73M2

## 2023-07-26 PROCEDURE — 250N000011 HC RX IP 250 OP 636: Performed by: NURSE PRACTITIONER

## 2023-07-26 PROCEDURE — 80053 COMPREHEN METABOLIC PANEL: CPT | Mod: ORL | Performed by: NURSE PRACTITIONER

## 2023-07-26 PROCEDURE — 80061 LIPID PANEL: CPT | Mod: ORL | Performed by: NURSE PRACTITIONER

## 2023-07-26 PROCEDURE — 87086 URINE CULTURE/COLONY COUNT: CPT | Mod: ORL | Performed by: NURSE PRACTITIONER

## 2023-07-26 PROCEDURE — 74177 CT ABD & PELVIS W/CONTRAST: CPT

## 2023-07-26 PROCEDURE — 82565 ASSAY OF CREATININE: CPT

## 2023-07-26 RX ORDER — IOPAMIDOL 755 MG/ML
67 INJECTION, SOLUTION INTRAVASCULAR ONCE
Status: COMPLETED | OUTPATIENT
Start: 2023-07-26 | End: 2023-07-26

## 2023-07-26 RX ADMIN — IOPAMIDOL 67 ML: 755 INJECTION, SOLUTION INTRAVENOUS at 17:15

## 2023-07-27 LAB
ALBUMIN SERPL BCG-MCNC: 4.3 G/DL (ref 3.5–5.2)
ALP SERPL-CCNC: 177 U/L (ref 35–104)
ALT SERPL W P-5'-P-CCNC: 27 U/L (ref 0–50)
ANION GAP SERPL CALCULATED.3IONS-SCNC: 11 MMOL/L (ref 7–15)
AST SERPL W P-5'-P-CCNC: 22 U/L (ref 0–45)
BILIRUB SERPL-MCNC: 0.5 MG/DL
BUN SERPL-MCNC: 13.6 MG/DL (ref 8–23)
CALCIUM SERPL-MCNC: 9.5 MG/DL (ref 8.8–10.2)
CHLORIDE SERPL-SCNC: 103 MMOL/L (ref 98–107)
CHOLEST SERPL-MCNC: 140 MG/DL
CREAT SERPL-MCNC: 0.85 MG/DL (ref 0.51–0.95)
DEPRECATED HCO3 PLAS-SCNC: 24 MMOL/L (ref 22–29)
GFR SERPL CREATININE-BSD FRML MDRD: 77 ML/MIN/1.73M2
GLUCOSE SERPL-MCNC: 135 MG/DL (ref 70–99)
HDLC SERPL-MCNC: 50 MG/DL
LDLC SERPL CALC-MCNC: 71 MG/DL
NONHDLC SERPL-MCNC: 90 MG/DL
POTASSIUM SERPL-SCNC: 4.8 MMOL/L (ref 3.4–5.3)
PROT SERPL-MCNC: 7.7 G/DL (ref 6.4–8.3)
SODIUM SERPL-SCNC: 138 MMOL/L (ref 136–145)
TRIGL SERPL-MCNC: 94 MG/DL

## 2023-07-28 LAB — BACTERIA UR CULT: NO GROWTH

## 2023-08-06 ENCOUNTER — HEALTH MAINTENANCE LETTER (OUTPATIENT)
Age: 63
End: 2023-08-06

## 2024-03-03 ENCOUNTER — HEALTH MAINTENANCE LETTER (OUTPATIENT)
Age: 64
End: 2024-03-03

## 2024-06-26 ENCOUNTER — LAB REQUISITION (OUTPATIENT)
Dept: LAB | Facility: CLINIC | Age: 64
End: 2024-06-26

## 2024-06-26 DIAGNOSIS — E11.65 TYPE 2 DIABETES MELLITUS WITH HYPERGLYCEMIA (H): ICD-10-CM

## 2024-06-26 LAB
ALBUMIN SERPL BCG-MCNC: 4.1 G/DL (ref 3.5–5.2)
ALP SERPL-CCNC: 169 U/L (ref 40–150)
ALT SERPL W P-5'-P-CCNC: 27 U/L (ref 0–50)
ANION GAP SERPL CALCULATED.3IONS-SCNC: 12 MMOL/L (ref 7–15)
AST SERPL W P-5'-P-CCNC: 20 U/L (ref 0–45)
BILIRUB SERPL-MCNC: 0.3 MG/DL
BUN SERPL-MCNC: 14.7 MG/DL (ref 8–23)
CALCIUM SERPL-MCNC: 9.1 MG/DL (ref 8.8–10.2)
CHLORIDE SERPL-SCNC: 106 MMOL/L (ref 98–107)
CHOLEST SERPL-MCNC: 140 MG/DL
CREAT SERPL-MCNC: 0.83 MG/DL (ref 0.51–0.95)
CREAT UR-MCNC: 228 MG/DL
DEPRECATED HCO3 PLAS-SCNC: 21 MMOL/L (ref 22–29)
EGFRCR SERPLBLD CKD-EPI 2021: 78 ML/MIN/1.73M2
FASTING STATUS PATIENT QL REPORTED: ABNORMAL
FASTING STATUS PATIENT QL REPORTED: ABNORMAL
GLUCOSE SERPL-MCNC: 180 MG/DL (ref 70–99)
HDLC SERPL-MCNC: 44 MG/DL
LDLC SERPL CALC-MCNC: 69 MG/DL
MICROALBUMIN UR-MCNC: 67.6 MG/L
MICROALBUMIN/CREAT UR: 29.65 MG/G CR (ref 0–25)
NONHDLC SERPL-MCNC: 96 MG/DL
POTASSIUM SERPL-SCNC: 4 MMOL/L (ref 3.4–5.3)
PROT SERPL-MCNC: 7.9 G/DL (ref 6.4–8.3)
SODIUM SERPL-SCNC: 139 MMOL/L (ref 135–145)
TRIGL SERPL-MCNC: 135 MG/DL

## 2024-06-26 PROCEDURE — 82570 ASSAY OF URINE CREATININE: CPT | Performed by: NURSE PRACTITIONER

## 2024-06-26 PROCEDURE — 80053 COMPREHEN METABOLIC PANEL: CPT | Performed by: NURSE PRACTITIONER

## 2024-06-26 PROCEDURE — 80061 LIPID PANEL: CPT | Performed by: NURSE PRACTITIONER

## 2024-09-29 ENCOUNTER — HEALTH MAINTENANCE LETTER (OUTPATIENT)
Age: 64
End: 2024-09-29

## 2024-10-15 ENCOUNTER — LAB REQUISITION (OUTPATIENT)
Dept: LAB | Facility: CLINIC | Age: 64
End: 2024-10-15

## 2024-10-15 DIAGNOSIS — E11.21 TYPE 2 DIABETES MELLITUS WITH DIABETIC NEPHROPATHY (H): ICD-10-CM

## 2024-10-15 LAB
ANION GAP SERPL CALCULATED.3IONS-SCNC: 12 MMOL/L (ref 7–15)
BUN SERPL-MCNC: 12.6 MG/DL (ref 8–23)
CALCIUM SERPL-MCNC: 9.1 MG/DL (ref 8.8–10.4)
CHLORIDE SERPL-SCNC: 107 MMOL/L (ref 98–107)
CREAT SERPL-MCNC: 0.85 MG/DL (ref 0.51–0.95)
EGFRCR SERPLBLD CKD-EPI 2021: 76 ML/MIN/1.73M2
GLUCOSE SERPL-MCNC: 179 MG/DL (ref 70–99)
HCO3 SERPL-SCNC: 21 MMOL/L (ref 22–29)
POTASSIUM SERPL-SCNC: 4.2 MMOL/L (ref 3.4–5.3)
SODIUM SERPL-SCNC: 140 MMOL/L (ref 135–145)

## 2024-10-15 PROCEDURE — 80048 BASIC METABOLIC PNL TOTAL CA: CPT | Performed by: NURSE PRACTITIONER

## 2024-12-08 ENCOUNTER — HEALTH MAINTENANCE LETTER (OUTPATIENT)
Age: 64
End: 2024-12-08

## 2025-03-26 ENCOUNTER — LAB REQUISITION (OUTPATIENT)
Dept: LAB | Facility: CLINIC | Age: 65
End: 2025-03-26

## 2025-03-26 DIAGNOSIS — E11.21 TYPE 2 DIABETES MELLITUS WITH DIABETIC NEPHROPATHY (H): ICD-10-CM

## 2025-03-26 LAB
ALBUMIN SERPL BCG-MCNC: 3.9 G/DL (ref 3.5–5.2)
ALP SERPL-CCNC: 154 U/L (ref 40–150)
ALT SERPL W P-5'-P-CCNC: 31 U/L (ref 0–50)
ANION GAP SERPL CALCULATED.3IONS-SCNC: 14 MMOL/L (ref 7–15)
AST SERPL W P-5'-P-CCNC: 26 U/L (ref 0–45)
BILIRUB SERPL-MCNC: 0.5 MG/DL
BUN SERPL-MCNC: 11.7 MG/DL (ref 8–23)
CALCIUM SERPL-MCNC: 9.1 MG/DL (ref 8.8–10.4)
CHLORIDE SERPL-SCNC: 103 MMOL/L (ref 98–107)
CHOLEST SERPL-MCNC: 127 MG/DL
CREAT SERPL-MCNC: 0.84 MG/DL (ref 0.51–0.95)
CREAT UR-MCNC: 183 MG/DL
EGFRCR SERPLBLD CKD-EPI 2021: 77 ML/MIN/1.73M2
FASTING STATUS PATIENT QL REPORTED: ABNORMAL
FASTING STATUS PATIENT QL REPORTED: ABNORMAL
GLUCOSE SERPL-MCNC: 156 MG/DL (ref 70–99)
HCO3 SERPL-SCNC: 19 MMOL/L (ref 22–29)
HDLC SERPL-MCNC: 40 MG/DL
LDLC SERPL CALC-MCNC: 72 MG/DL
MICROALBUMIN UR-MCNC: 20.7 MG/L
MICROALBUMIN/CREAT UR: 11.31 MG/G CR (ref 0–25)
NONHDLC SERPL-MCNC: 87 MG/DL
POTASSIUM SERPL-SCNC: 4.3 MMOL/L (ref 3.4–5.3)
PROT SERPL-MCNC: 7.7 G/DL (ref 6.4–8.3)
SODIUM SERPL-SCNC: 136 MMOL/L (ref 135–145)
TRIGL SERPL-MCNC: 74 MG/DL

## 2025-03-26 PROCEDURE — 84155 ASSAY OF PROTEIN SERUM: CPT | Performed by: NURSE PRACTITIONER

## 2025-03-26 PROCEDURE — 82570 ASSAY OF URINE CREATININE: CPT | Performed by: NURSE PRACTITIONER

## 2025-03-26 PROCEDURE — 82043 UR ALBUMIN QUANTITATIVE: CPT | Performed by: NURSE PRACTITIONER

## 2025-03-26 PROCEDURE — 82374 ASSAY BLOOD CARBON DIOXIDE: CPT | Performed by: NURSE PRACTITIONER

## 2025-03-26 PROCEDURE — 80061 LIPID PANEL: CPT | Performed by: NURSE PRACTITIONER

## 2025-03-26 PROCEDURE — 82565 ASSAY OF CREATININE: CPT | Performed by: NURSE PRACTITIONER

## 2025-03-26 PROCEDURE — 82465 ASSAY BLD/SERUM CHOLESTEROL: CPT | Performed by: NURSE PRACTITIONER

## 2025-04-06 ENCOUNTER — HEALTH MAINTENANCE LETTER (OUTPATIENT)
Age: 65
End: 2025-04-06

## 2025-08-10 ENCOUNTER — HEALTH MAINTENANCE LETTER (OUTPATIENT)
Age: 65
End: 2025-08-10